# Patient Record
Sex: FEMALE | Race: WHITE | HISPANIC OR LATINO | Employment: FULL TIME | URBAN - METROPOLITAN AREA
[De-identification: names, ages, dates, MRNs, and addresses within clinical notes are randomized per-mention and may not be internally consistent; named-entity substitution may affect disease eponyms.]

---

## 2017-01-18 ENCOUNTER — HOSPITAL ENCOUNTER (EMERGENCY)
Facility: HOSPITAL | Age: 37
Discharge: HOME | End: 2017-01-18
Attending: EMERGENCY MEDICINE
Payer: MEDICAID

## 2017-01-18 VITALS
RESPIRATION RATE: 18 BRPM | HEIGHT: 67 IN | TEMPERATURE: 98 F | WEIGHT: 293 LBS | OXYGEN SATURATION: 98 % | DIASTOLIC BLOOD PRESSURE: 73 MMHG | BODY MASS INDEX: 45.99 KG/M2 | SYSTOLIC BLOOD PRESSURE: 134 MMHG | HEART RATE: 94 BPM

## 2017-01-18 DIAGNOSIS — M71.22 BAKER'S CYST OF KNEE, LEFT: Primary | ICD-10-CM

## 2017-01-18 DIAGNOSIS — R52 PAIN: ICD-10-CM

## 2017-01-18 LAB
AMORPH CRY URNS QL MICRO: ABNORMAL
B-HCG UR QL: NEGATIVE
BACTERIA #/AREA URNS HPF: ABNORMAL /HPF
BILIRUB UR QL STRIP: NEGATIVE
CLARITY UR: ABNORMAL
COLOR UR: ABNORMAL
CTP QC/QA: YES
GLUCOSE UR QL STRIP: NEGATIVE
HGB UR QL STRIP: ABNORMAL
HYALINE CASTS #/AREA URNS LPF: 0 /LPF
KETONES UR QL STRIP: NEGATIVE
LEUKOCYTE ESTERASE UR QL STRIP: NEGATIVE
MICROSCOPIC COMMENT: ABNORMAL
NITRITE UR QL STRIP: NEGATIVE
PH UR STRIP: 5 [PH] (ref 5–8)
PROT UR QL STRIP: ABNORMAL
RBC #/AREA URNS HPF: >100 /HPF (ref 0–4)
SP GR UR STRIP: 1.02 (ref 1–1.03)
SQUAMOUS #/AREA URNS HPF: 2 /HPF
URN SPEC COLLECT METH UR: ABNORMAL
UROBILINOGEN UR STRIP-ACNC: NEGATIVE EU/DL
WBC #/AREA URNS HPF: 2 /HPF (ref 0–5)

## 2017-01-18 PROCEDURE — 81000 URINALYSIS NONAUTO W/SCOPE: CPT

## 2017-01-18 PROCEDURE — 99284 EMERGENCY DEPT VISIT MOD MDM: CPT | Mod: 25

## 2017-01-18 PROCEDURE — 81025 URINE PREGNANCY TEST: CPT | Performed by: EMERGENCY MEDICINE

## 2017-01-18 RX ORDER — IBUPROFEN 800 MG/1
800 TABLET ORAL EVERY 8 HOURS PRN
Qty: 30 TABLET | Refills: 0 | Status: SHIPPED | OUTPATIENT
Start: 2017-01-18 | End: 2018-01-13

## 2017-01-18 NOTE — DISCHARGE INSTRUCTIONS
Bakers Cyst    You have a Bakers cyst. This is a lump or bulge in the back of your knee. It is caused when extra joint fluid flows into a small sac behind the knee. The extra fluid occurs because arthritis or a torn cartilage irritates the knee joint.  A small Bakers cyst often has no symptoms. A larger cyst can cause knee pain or a feeling of pressure behind your knee when you try to fully straighten or bend that joint. A Bakers cyst can leak, leading fluid to move down into your lower leg. This causes swelling, pain, and redness.  Treatment involves draining the extra fluid. Or medicine may be injected to reduce redness and swelling. If the extra fluid is caused by a torn cartilage, then surgery to repair the cartilage may be the best treatment option. If arthritis is the cause, and it does not get better with treatment, surgery can be done to remove the cyst.  Home care  · If you have knee pain, stay off the affected leg as much as possible until the pain eases.  · Apply an ice pack to the painful area for no more than 20 minutes. Do this every 3 to 6 hours for the first 24 to 48 hours. Keep using ice packs 3 to 4 times a day for the next few days, as needed for pain. To make an ice pack, put ice cubes in a sealed zip-lock plastic bag. Wrap the bag in a clean, thin towel or cloth. Never put ice or an ice pack directly on the skin.  · If you were given a hook-and-loop knee brace, you may open the brace to apply ice. Unless told otherwise, you may remove the brace to bathe and sleep.  · You may use over-the-counter pain medicine to control pain, unless another medicine was prescribed. Talk with your provider before using these medicines if you have chronic liver or kidney disease, or have ever had a stomach ulcer or GI (gastrointestinal) bleeding.     · If crutches or a walker have been recommended, dont bear full weight on your injured leg until you can do so without pain. Check with your provider before  returning to sports or full work duties.  Follow-up care  Follow up with your healthcare provider within 1 to 2 weeks, or as advised.  If X-rays were taken, you will be notified of any new findings that may affect your care.  When to seek medical advice  Call your healthcare provider right away if any of these occur:  · Toes or foot become swollen, cold, blue, numb or tingly  · Pain or swelling increases  · Warmth or redness appears over the knee  · Redness, swelling or pain occurs in the calf or lower leg  © 8901-2422 [employer]. [address] [place] [address]. All rights reserved. This information is not intended as a substitute for professional medical care. Always follow your healthcare professional's instructions.

## 2017-01-18 NOTE — ED AVS SNAPSHOT
[facility]  [address] [place] [address]               Mary Billings   2017  3:12 PM   ED    Description:  Female : 1980   Department:  [facility]           Your Care was Coordinated By:     Provider Role From To    Spencer Hector MD Attending Provider [phone] --    Lurdes Hale NP Nurse Practitioner [phone] --      Reason for Visit     Leg Swelling           Diagnoses this Visit        Comments    Baker's cyst of knee, left    -  Primary     Pain           ED Disposition     ED Disposition Condition Comment    Discharge             To Do List           Follow-up Information     Follow up with Casper Boyce MD.    Specialty:  Internal Medicine    Why:  As needed    Contact information:    [address]  [facility] [address]  [phone]          Follow up with [facility].    Specialty:  Emergency Medicine    Why:  If symptoms worsen or any other concerns    Contact information:    [address] [place] [address] [phone]        Follow up with Spencer Schulz MD.    Specialty:  Orthopedic Surgery    Why:  Call for appointment    Contact information:    [address] [place] [id number] [phone]         These Medications        Disp Refills Start End    ibuprofen (ADVIL,MOTRIN) 800 MG tablet 30 tablet 0 2017     Take 1 tablet (800 mg total) by mouth every 8 (eight) hours as needed for Pain. - Oral    Pharmacy: [facility] [address] [place] [address] [place] [address] AT [facility] Ph #: [phone]         [facility]     [facility] -  Assistance  Registered nurses in the [facility] provide clinical advisement, health education, appointment booking, and other advisory services.  Call for this free service at [phone].             Medications           Message regarding  "Medications     Verify the changes and/or additions to your medication regime listed below are the same as discussed with your clinician today.  If any of these changes or additions are incorrect, please notify your healthcare provider.        START taking these NEW medications        Refills    ibuprofen (ADVIL,MOTRIN) 800 MG tablet 0    Sig: Take 1 tablet (800 mg total) by mouth every 8 (eight) hours as needed for Pain.    Class: Print    Route: Oral      STOP taking these medications     duloxetine (CYMBALTA) 30 MG capsule Take 1 capsule (30 mg total) by mouth once daily.           Verify that the below list of medications is an accurate representation of the medications you are currently taking.  If none reported, the list may be blank. If incorrect, please contact your healthcare provider. Carry this list with you in case of emergency.           Current Medications     gabapentin (NEURONTIN) 300 MG capsule Take 1 capsule (300 mg total) by mouth 3 (three) times daily.    ibuprofen (ADVIL,MOTRIN) 800 MG tablet Take 1 tablet (800 mg total) by mouth every 8 (eight) hours as needed for Pain.           Clinical Reference Information           Your Vitals Were     BP Pulse Temp Resp Height Weight    134/73 (BP Location: Left arm, Patient Position: Sitting, BP Method: Automatic) 94 98.3 °F (36.8 °C) (Oral) 18 5' 7" (1.702 m) 192.8 kg (425 lb)    Last Period SpO2 BMI          01/14/2017 98% 66.56 kg/m2        Allergies as of 1/18/2017     No Known Allergies      Immunizations Administered on Date of Encounter - 1/18/2017     None      ED Micro, Lab, POCT     Start Ordered       Status Ordering Provider    01/18/17 1533 01/18/17 1532  Urinalysis  STAT      Final result     01/18/17 1532 01/18/17 1532  Urinalysis Microscopic  Once      Final result     01/18/17 1511 01/18/17 1510  POCT urine pregnancy  Once      Final result       ED Imaging Orders     Start Ordered       Status Ordering Provider    01/18/17 1532 01/18/17 " 1531  US Lower Extremity Veins Left  1 time imaging      Final result         Discharge Instructions         Bakers Cyst    You have a Bakers cyst. This is a lump or bulge in the back of your knee. It is caused when extra joint fluid flows into a small sac behind the knee. The extra fluid occurs because arthritis or a torn cartilage irritates the knee joint.  A small Bakers cyst often has no symptoms. A larger cyst can cause knee pain or a feeling of pressure behind your knee when you try to fully straighten or bend that joint. A Bakers cyst can leak, leading fluid to move down into your lower leg. This causes swelling, pain, and redness.  Treatment involves draining the extra fluid. Or medicine may be injected to reduce redness and swelling. If the extra fluid is caused by a torn cartilage, then surgery to repair the cartilage may be the best treatment option. If arthritis is the cause, and it does not get better with treatment, surgery can be done to remove the cyst.  Home care  · If you have knee pain, stay off the affected leg as much as possible until the pain eases.  · Apply an ice pack to the painful area for no more than 20 minutes. Do this every 3 to 6 hours for the first 24 to 48 hours. Keep using ice packs 3 to 4 times a day for the next few days, as needed for pain. To make an ice pack, put ice cubes in a sealed zip-lock plastic bag. Wrap the bag in a clean, thin towel or cloth. Never put ice or an ice pack directly on the skin.  · If you were given a hook-and-loop knee brace, you may open the brace to apply ice. Unless told otherwise, you may remove the brace to bathe and sleep.  · You may use over-the-counter pain medicine to control pain, unless another medicine was prescribed. Talk with your provider before using these medicines if you have chronic liver or kidney disease, or have ever had a stomach ulcer or GI (gastrointestinal) bleeding.     · If crutches or a walker have been recommended,  dont bear full weight on your injured leg until you can do so without pain. Check with your provider before returning to sports or full work duties.  Follow-up care  Follow up with your healthcare provider within 1 to 2 weeks, or as advised.  If X-rays were taken, you will be notified of any new findings that may affect your care.  When to seek medical advice  Call your healthcare provider right away if any of these occur:  · Toes or foot become swollen, cold, blue, numb or tingly  · Pain or swelling increases  · Warmth or redness appears over the knee  · Redness, swelling or pain occurs in the calf or lower leg  © 6678-4202 [employer]. [address] [place] [address], [place] [address]. All rights reserved. This information is not intended as a substitute for professional medical care. Always follow your healthcare professional's instructions.          Smoking Cessation     If you would like to quit smoking:   You may be eligible for free services if you are a [place] resident and started smoking cigarettes before September 1, 1988.  Call the Smoking Cessation Trust (SCT) toll free at (034) 375-6137 or (514) 030-6550.   Call 6-418-QUIT-NOW if you do not meet the above criteria.             [facility] complies with applicable Federal civil rights laws and does not discriminate on the basis of race, color, national origin, age, disability, or sex.        Language Assistance Services     ATTENTION: Language assistance services are available, free of charge. Please call [phone].      ATENCIÓN: Si habla español, tiene a bullard disposición servicios gratuitos de asistencia lingüística. Llame al [phone].     CHÚ Ý: N?u b?n nói Ti?ng Vi?t, có các d?ch v? h? tr? ngôn ng? mi?n phí dành cho b?n. G?i s? [phone].

## 2017-01-18 NOTE — ED PROVIDER NOTES
"Encounter Date: 2017    SCRIBE #1 NOTE: I, Cierra David , am scribing for, and in the presence of,  Lurdes Hale NP. I have scribed the following portions of the note - Other sections scribed: HPI and ROS .       History     Chief Complaint   Patient presents with    Leg Swelling     " For the past month my left knee has been hurting me, and for the past few days I have been having swelling in my left lower leg."      Review of patient's allergies indicates:  No Known Allergies  HPI Comments: CC: Leg swelling    HPI: This obese 37 y/o female with fibromyalgia and gall stones presents to the ED for evaluation of left knee pain that began 1 month ago and lower left leg pain, worse to her calf, with swelling that began 3 days ago. Pt has attempted treatment of the pain with ibuprofen, gabapentin and motrin, no relief. Pt also reports back pain that feels similar to back pain assocaited with 2 previous UTIs. Pt denies urinary symptoms. Pt's LMP was 17. No allergies.     The history is provided by the patient. No  was used.     Past Medical History   Diagnosis Date    Depression     Gall stones     Migraine headache     Obesity     SHEREE (obstructive sleep apnea)      No past medical history pertinent negatives.  Past Surgical History   Procedure Laterality Date    Russell tooth extraction      Tubal ligation       section       x 3 with BTL     Family History   Problem Relation Age of Onset    Diabetes Father     Hypertension Father     Breast cancer Mother     Depression Mother     Hypertension Mother     Cancer Brother      prostate    Cancer Maternal Grandmother     Hypertension Paternal Grandmother     Ovarian cancer Neg Hx     Colon cancer Neg Hx      Social History   Substance Use Topics    Smoking status: Former Smoker     Packs/day: 0.50     Years: 1.00     Quit date: 8/3/2016    Smokeless tobacco: Never Used    Alcohol use No     Review of Systems "   Constitutional: Negative for chills and fever.   HENT: Negative for ear pain and sore throat.    Respiratory: Negative for cough and shortness of breath.    Cardiovascular: Positive for leg swelling (lower left leg and left foot ).   Gastrointestinal: Negative for abdominal pain, nausea and vomiting.   Genitourinary: Negative for dysuria and flank pain.   Musculoskeletal: Positive for back pain and myalgias (lower left leg ).   Skin: Negative for rash.   Neurological: Negative for syncope and numbness.   Psychiatric/Behavioral: Negative for confusion.       Physical Exam   Initial Vitals   BP Pulse Resp Temp SpO2   01/18/17 1457 01/18/17 1457 01/18/17 1457 01/18/17 1457 01/18/17 1457   138/80 99 18 99.4 °F (37.4 °C) 97 %     Physical Exam    Nursing note and vitals reviewed.  Constitutional: She appears well-developed and well-nourished.   HENT:   Head: Normocephalic.   Eyes: Conjunctivae are normal.   Neck: Normal range of motion. Neck supple.   Cardiovascular: Normal rate, regular rhythm and normal heart sounds.   Pulmonary/Chest: Breath sounds normal. No respiratory distress. She has no wheezes.   Abdominal: Soft. There is no tenderness.   Mild CVAT L>R   Musculoskeletal: Normal range of motion. She exhibits edema and tenderness.   Mild edema to left foot.  No erythema, warmth, to left lower extremity.    (+) homins   (+) left calf tenderness   Neurological: She is alert and oriented to person, place, and time.   Skin: Skin is warm and dry.         ED Course   Procedures  Labs Reviewed   URINALYSIS - Abnormal; Notable for the following:        Result Value    Color, UA Red (*)     Appearance, UA Cloudy (*)     Protein, UA 2+ (*)     Occult Blood UA 3+ (*)     All other components within normal limits   URINALYSIS MICROSCOPIC - Abnormal; Notable for the following:     RBC, UA >100 (*)     All other components within normal limits   POCT URINE PREGNANCY             Medical Decision Making:   Initial Assessment:    36yr old female presents for swelling to left foot and left calf pain.  Differential Diagnosis:   DVT  Muscle strain  Dependant edema  ED Management:  Pts exam was positive for left calf pain and very mild edema to foot.  pt does not appear ill or toxic    Ultrasound results were reviewed and discussed with pt.     Based on exam today - I have low suspicion for medical, surgical or other life threatening illness and I believe pt is safe for discharge and outpatient f/u.    Pt verbalizes understanding of d/c instructions and will return for worsening condition.    Case discussed with attending who agrees with assessment and plan.               Scribe Attestation:   Scribe #1: I performed the above scribed service and the documentation accurately describes the services I performed. I attest to the accuracy of the note.    Attending Attestation:     Physician Attestation Statement for NP/PA:   I discussed this assessment and plan of this patient with the NP/PA, but I did not personally examine the patient. The face to face encounter was performed by the NP/PA.    Other NP/PA Attestation Additions:      Medical Decision Making: Agree with assessment and management.  No evidence of DVT.       Physician Attestation for Scribe:  Physician Attestation Statement for Scribe #1: I, Lurdes Hale NP, reviewed documentation, as scribed by Cierra Hernandez  in my presence, and it is both accurate and complete.                 ED Course   Value Comment By Time   US Lower Extremity Veins Left (Reviewed) Lurdes Hale NP 01/18 1630     Clinical Impression:   The primary encounter diagnosis was Baker's cyst of knee, left. A diagnosis of Pain was also pertinent to this visit.    Disposition:   Disposition: Discharged  Condition: Stable       Lurdes Hale NP  01/18/17 9053       Spencer Hector MD  01/19/17 0804

## 2018-01-13 ENCOUNTER — HOSPITAL ENCOUNTER (EMERGENCY)
Facility: OTHER | Age: 38
Discharge: HOME | End: 2018-01-13
Attending: EMERGENCY MEDICINE
Payer: MEDICAID

## 2018-01-13 VITALS
OXYGEN SATURATION: 99 % | HEIGHT: 67 IN | BODY MASS INDEX: 45.99 KG/M2 | WEIGHT: 293 LBS | DIASTOLIC BLOOD PRESSURE: 58 MMHG | TEMPERATURE: 100 F | HEART RATE: 81 BPM | RESPIRATION RATE: 16 BRPM | SYSTOLIC BLOOD PRESSURE: 125 MMHG

## 2018-01-13 DIAGNOSIS — J11.1 INFLUENZA-LIKE ILLNESS: Primary | ICD-10-CM

## 2018-01-13 LAB
B-HCG UR QL: NEGATIVE
CTP QC/QA: YES

## 2018-01-13 PROCEDURE — 99284 EMERGENCY DEPT VISIT MOD MDM: CPT | Mod: 25

## 2018-01-13 PROCEDURE — 25000003 PHARM REV CODE 250: Performed by: EMERGENCY MEDICINE

## 2018-01-13 PROCEDURE — 81025 URINE PREGNANCY TEST: CPT | Performed by: EMERGENCY MEDICINE

## 2018-01-13 RX ORDER — OSELTAMIVIR PHOSPHATE 75 MG/1
75 CAPSULE ORAL 2 TIMES DAILY
Qty: 10 CAPSULE | Refills: 0 | Status: SHIPPED | OUTPATIENT
Start: 2018-01-13 | End: 2018-01-18

## 2018-01-13 RX ORDER — ALBUTEROL SULFATE 90 UG/1
1-2 AEROSOL, METERED RESPIRATORY (INHALATION) EVERY 6 HOURS PRN
Qty: 1 INHALER | Refills: 0 | Status: SHIPPED | OUTPATIENT
Start: 2018-01-13 | End: 2019-01-13

## 2018-01-13 RX ORDER — ACETAMINOPHEN 500 MG
1000 TABLET ORAL
Status: COMPLETED | OUTPATIENT
Start: 2018-01-13 | End: 2018-01-13

## 2018-01-13 RX ORDER — IBUPROFEN 600 MG/1
600 TABLET ORAL EVERY 6 HOURS PRN
Qty: 30 TABLET | Refills: 0 | OUTPATIENT
Start: 2018-01-13 | End: 2020-01-09

## 2018-01-13 RX ORDER — BENZONATATE 100 MG/1
200 CAPSULE ORAL
Status: COMPLETED | OUTPATIENT
Start: 2018-01-13 | End: 2018-01-13

## 2018-01-13 RX ORDER — PROMETHAZINE HYDROCHLORIDE AND DEXTROMETHORPHAN HYDROBROMIDE 6.25; 15 MG/5ML; MG/5ML
5 SYRUP ORAL 3 TIMES DAILY PRN
Qty: 200 ML | Refills: 0 | Status: SHIPPED | OUTPATIENT
Start: 2018-01-13 | End: 2018-01-23

## 2018-01-13 RX ADMIN — ACETAMINOPHEN 1000 MG: 500 TABLET ORAL at 06:01

## 2018-01-13 RX ADMIN — BENZONATATE 200 MG: 100 CAPSULE ORAL at 06:01

## 2018-01-14 NOTE — ED PROVIDER NOTES
Encounter Date: 2018       History     Chief Complaint   Patient presents with    Cough     cough x 1 day and it hurts when I cough since I woke up from my nap     Mary Billings is a 37 y.o. female who presents to the Emergency Department with cough, congestion, body aches, fevers and chills.  Symptoms started today.  Last week patient son had influenza B.  Patient states it started with a cold today she feels like she got hit by a truck.  Symptoms worsen suddenly today      The history is provided by the patient.   URI   The primary symptoms include fever, cough and myalgias. Primary symptoms do not include sore throat, nausea or rash. The current episode started today. This is a new problem. The problem has been gradually worsening. The fever began today. The fever has been gradually worsening since its onset. The temperature was taken by no thermometer. The maximum temperature recorded prior to her arrival was unknown.   The cough began today. The cough is productive. There is nondescript sputum produced.   Myalgias began today. The myalgias have been gradually worsening since their onset. The myalgias are generalized. The myalgias are not associated with weakness, tenderness or swelling.   The onset of the illness is associated with exposure to sick contacts. Symptoms associated with the illness include congestion and rhinorrhea. The illness is not associated with chills, facial pain or sinus pressure.     Review of patient's allergies indicates:  No Known Allergies  Past Medical History:   Diagnosis Date    Depression     Fibromyalgia     Gall stones     Hypertension     Migraine headache     Obesity     SHEREE (obstructive sleep apnea)      Past Surgical History:   Procedure Laterality Date     SECTION      x 3 with BTL    TUBAL LIGATION      WISDOM TOOTH EXTRACTION       Family History   Problem Relation Age of Onset    Diabetes Father     Hypertension Father      Breast cancer Mother     Depression Mother     Hypertension Mother     Cancer Brother      prostate    Cancer Maternal Grandmother     Hypertension Paternal Grandmother     Ovarian cancer Neg Hx     Colon cancer Neg Hx      Social History   Substance Use Topics    Smoking status: Former Smoker     Packs/day: 0.50     Years: 1.00     Quit date: 8/3/2016    Smokeless tobacco: Never Used    Alcohol use No     Review of Systems   Constitutional: Positive for fever. Negative for chills.   HENT: Positive for congestion and rhinorrhea. Negative for sinus pressure and sore throat.    Respiratory: Positive for cough. Negative for shortness of breath.    Cardiovascular: Negative for chest pain.   Gastrointestinal: Negative for nausea.   Genitourinary: Negative for dysuria.   Musculoskeletal: Positive for myalgias. Negative for back pain.   Skin: Negative for rash.   Neurological: Negative for weakness.   Hematological: Does not bruise/bleed easily.   All other systems reviewed and are negative.      Physical Exam     Initial Vitals [01/13/18 1738]   BP Pulse Resp Temp SpO2   133/73 98 (!) 22 (!) 101.1 °F (38.4 °C) 97 %      MAP       93         Physical Exam    Nursing note and vitals reviewed.  Constitutional: She appears well-developed and well-nourished. She is Obese .   HENT:   Head: Normocephalic and atraumatic.   Right Ear: Tympanic membrane and external ear normal.   Left Ear: Tympanic membrane and external ear normal.   Nose: Mucosal edema and rhinorrhea present. Right sinus exhibits no maxillary sinus tenderness and no frontal sinus tenderness. Left sinus exhibits no maxillary sinus tenderness and no frontal sinus tenderness.   Mouth/Throat: Uvula is midline and mucous membranes are normal. Posterior oropharyngeal erythema present. No oropharyngeal exudate or posterior oropharyngeal edema.   Eyes: Conjunctivae and EOM are normal. Pupils are equal, round, and reactive to light. Right eye exhibits no  discharge. Left eye exhibits no discharge.   Neck: Normal range of motion. Neck supple. No JVD present.   Cardiovascular: Normal rate, regular rhythm, normal heart sounds and intact distal pulses. Exam reveals no gallop and no friction rub.    No murmur heard.  Pulmonary/Chest: Breath sounds normal. No respiratory distress. She has no wheezes. She has no rhonchi. She has no rales. She exhibits no tenderness.   Abdominal: Soft. Bowel sounds are normal. She exhibits no distension and no mass. There is no tenderness. There is no rebound and no guarding.   Musculoskeletal: Normal range of motion. She exhibits no edema or tenderness.   Lymphadenopathy:     She has no cervical adenopathy.   Neurological: She is alert and oriented to person, place, and time. She has normal strength and normal reflexes. She displays normal reflexes. No cranial nerve deficit or sensory deficit.   Skin: Skin is warm and dry. Capillary refill takes less than 2 seconds. No rash noted. No pallor.   Psychiatric: She has a normal mood and affect.         ED Course   Procedures  Labs Reviewed   POCT URINE PREGNANCY        Imaging Results          X-Ray Chest PA And Lateral (Final result)  Result time 01/13/18 18:18:02    Final result by Rizwana Matta MD (01/13/18 18:18:02)                 Impression:     No acute cardiopulmonary process.          Electronically signed by: RIZWANA MATTA MD  Date:     01/13/18  Time:    18:18              Narrative:    Chest PA and lateral    Indication:Cough    Comparison:7/31/2015    Findings:  The cardiomediastinal silhouette is not enlarged.  There is no pleural effusion.  The trachea is midline.  The lungs are symmetrically expanded bilaterally without evidence of acute parenchymal process. No large focal consolidation seen.  There is no pneumothorax.  The osseous structures are unremarkable.                                                   ED Course        Medical decision making   Chief complaint:  Cough, congestion, and body aches.  Feels like the flu.  Differential diagnosis: URI, viral illness, influenza, bronchitis, pneumonia  Treatment in the ED Physical Exam, Tylenol for fever and Tessalon Perles for cough  Patient reports feeling better after medication.  Temperature decreased after Tylenol.  Discussed labs, and imaging results.    Fill and take prescriptions Tamiflu, ibuprofen, & Promethazine DM as directed.  Return to the ED if symptoms worsen or do not resolve.   Answered questions and discussed discharge plan.    Patient feels much better and is ready for discharge.  Follow up with PCP in 1 days.    Clinical Impression:   The encounter diagnosis was Influenza-like illness.                           Catherine Altman DO  01/13/18 0195

## 2018-06-05 ENCOUNTER — HOSPITAL ENCOUNTER (EMERGENCY)
Facility: HOSPITAL | Age: 38
Discharge: HOME | End: 2018-06-05
Attending: EMERGENCY MEDICINE
Payer: MEDICAID

## 2018-06-05 VITALS
SYSTOLIC BLOOD PRESSURE: 142 MMHG | RESPIRATION RATE: 18 BRPM | HEIGHT: 67 IN | HEART RATE: 79 BPM | TEMPERATURE: 99 F | OXYGEN SATURATION: 99 % | BODY MASS INDEX: 45.99 KG/M2 | WEIGHT: 293 LBS | DIASTOLIC BLOOD PRESSURE: 57 MMHG

## 2018-06-05 DIAGNOSIS — R39.89 GENITAL SORE: Primary | ICD-10-CM

## 2018-06-05 LAB
B-HCG UR QL: NEGATIVE
BILIRUBIN, POC UA: NEGATIVE
BLOOD, POC UA: ABNORMAL
CLARITY, POC UA: CLEAR
COLOR, POC UA: YELLOW
CTP QC/QA: YES
GLUCOSE, POC UA: NEGATIVE
KETONES, POC UA: NEGATIVE
LEUKOCYTE EST, POC UA: ABNORMAL
NITRITE, POC UA: POSITIVE
PH UR STRIP: 6 [PH]
PROTEIN, POC UA: ABNORMAL
SPECIFIC GRAVITY, POC UA: >=1.03
UROBILINOGEN, POC UA: 0.2 E.U./DL

## 2018-06-05 PROCEDURE — 87529 HSV DNA AMP PROBE: CPT

## 2018-06-05 PROCEDURE — 87510 GARDNER VAG DNA DIR PROBE: CPT

## 2018-06-05 PROCEDURE — 87088 URINE BACTERIA CULTURE: CPT

## 2018-06-05 PROCEDURE — 99284 EMERGENCY DEPT VISIT MOD MDM: CPT | Mod: 25

## 2018-06-05 PROCEDURE — 25000003 PHARM REV CODE 250: Performed by: EMERGENCY MEDICINE

## 2018-06-05 PROCEDURE — 87147 CULTURE TYPE IMMUNOLOGIC: CPT

## 2018-06-05 PROCEDURE — 63600175 PHARM REV CODE 636 W HCPCS: Performed by: EMERGENCY MEDICINE

## 2018-06-05 PROCEDURE — 87480 CANDIDA DNA DIR PROBE: CPT

## 2018-06-05 PROCEDURE — 87086 URINE CULTURE/COLONY COUNT: CPT

## 2018-06-05 PROCEDURE — 81003 URINALYSIS AUTO W/O SCOPE: CPT

## 2018-06-05 PROCEDURE — 96372 THER/PROPH/DIAG INJ SC/IM: CPT

## 2018-06-05 PROCEDURE — 87186 SC STD MICRODIL/AGAR DIL: CPT

## 2018-06-05 PROCEDURE — 87077 CULTURE AEROBIC IDENTIFY: CPT

## 2018-06-05 PROCEDURE — 87491 CHLMYD TRACH DNA AMP PROBE: CPT

## 2018-06-05 PROCEDURE — 81025 URINE PREGNANCY TEST: CPT | Performed by: EMERGENCY MEDICINE

## 2018-06-05 RX ORDER — CEFTRIAXONE 250 MG/1
250 INJECTION, POWDER, FOR SOLUTION INTRAMUSCULAR; INTRAVENOUS
Status: COMPLETED | OUTPATIENT
Start: 2018-06-05 | End: 2018-06-05

## 2018-06-05 RX ORDER — LIDOCAINE HYDROCHLORIDE 20 MG/ML
JELLY TOPICAL
Status: COMPLETED | OUTPATIENT
Start: 2018-06-05 | End: 2018-06-05

## 2018-06-05 RX ORDER — LIDOCAINE HYDROCHLORIDE 20 MG/ML
JELLY TOPICAL 2 TIMES DAILY
Qty: 1 TUBE | Refills: 0 | Status: SHIPPED | OUTPATIENT
Start: 2018-06-05 | End: 2021-01-14

## 2018-06-05 RX ORDER — METRONIDAZOLE 500 MG/1
500 TABLET ORAL 2 TIMES DAILY
Qty: 20 TABLET | Refills: 0 | Status: SHIPPED | OUTPATIENT
Start: 2018-06-05 | End: 2018-06-15

## 2018-06-05 RX ORDER — DOXYCYCLINE 100 MG/1
100 CAPSULE ORAL 2 TIMES DAILY
Qty: 20 CAPSULE | Refills: 0 | Status: SHIPPED | OUTPATIENT
Start: 2018-06-05 | End: 2018-06-15

## 2018-06-05 RX ORDER — VALACYCLOVIR HYDROCHLORIDE 1 G/1
1000 TABLET, FILM COATED ORAL 3 TIMES DAILY
Qty: 30 TABLET | Refills: 0 | Status: SHIPPED | OUTPATIENT
Start: 2018-06-05 | End: 2018-06-15

## 2018-06-05 RX ORDER — AZITHROMYCIN 250 MG/1
1000 TABLET, FILM COATED ORAL
Status: COMPLETED | OUTPATIENT
Start: 2018-06-05 | End: 2018-06-05

## 2018-06-05 RX ORDER — ONDANSETRON 4 MG/1
4 TABLET, FILM COATED ORAL EVERY 8 HOURS PRN
Qty: 20 TABLET | Refills: 0 | Status: SHIPPED | OUTPATIENT
Start: 2018-06-05 | End: 2021-01-14

## 2018-06-05 RX ADMIN — AZITHROMYCIN MONOHYDRATE 1000 MG: 250 TABLET ORAL at 03:06

## 2018-06-05 RX ADMIN — LIDOCAINE HYDROCHLORIDE 10 ML: 20 JELLY TOPICAL at 03:06

## 2018-06-05 RX ADMIN — CEFTRIAXONE SODIUM 250 MG: 250 INJECTION, POWDER, FOR SOLUTION INTRAMUSCULAR; INTRAVENOUS at 03:06

## 2018-06-05 NOTE — ED PROVIDER NOTES
"Encounter Date: 2018    SCRIBE #1 NOTE: I, Guillermo Montanez, am scribing for, and in the presence of,  Evelyn Farfan NP. I have scribed the entire note.       History     Chief Complaint   Patient presents with    Female  Problem     pt reports vaginal itching, burning and burning with urination x's 6 days . "I've had a UTI before but this feels different."     This is a 37 y.o. Female with a past medical history significant for anxiety and obesity who presents with 1 week of vaginal discomfort and itching. She notes her symptoms have been progressing. She has tried OTC creams for her symptoms without relief. She describes her pain as burning in quality. Her pian is exacerbated by washing the area and when she urinates. She notes " I fell little bumps where I hurt."  She notes associated nausea. She denies any fever, vaginal discharge, abdominal pain, or vomiting. Her LMP was 1 week ago. She denies any concern for STIs.       The history is provided by the patient.     Review of patient's allergies indicates:  No Known Allergies  Past Medical History:   Diagnosis Date    Depression     Fibromyalgia     Gall stones     Hypertension     Migraine headache     Obesity     SHEREE (obstructive sleep apnea)      Past Surgical History:   Procedure Laterality Date     SECTION      x 3 with BTL    TUBAL LIGATION      WISDOM TOOTH EXTRACTION       Family History   Problem Relation Age of Onset    Diabetes Father     Hypertension Father     Breast cancer Mother     Depression Mother     Hypertension Mother     Cancer Brother         prostate    Cancer Maternal Grandmother     Hypertension Paternal Grandmother     Ovarian cancer Neg Hx     Colon cancer Neg Hx      Social History   Substance Use Topics    Smoking status: Former Smoker     Packs/day: 0.50     Years: 1.00     Quit date: 8/3/2016    Smokeless tobacco: Never Used    Alcohol use No     Review of Systems   Constitutional: Negative for " chills and fever.   HENT: Negative for sore throat.    Eyes: Negative for visual disturbance.   Respiratory: Negative for shortness of breath.    Cardiovascular: Negative for chest pain.   Gastrointestinal: Positive for nausea. Negative for abdominal pain and vomiting.   Genitourinary: Positive for vaginal discharge and vaginal pain. Negative for dysuria, flank pain, frequency and vaginal bleeding.   Musculoskeletal: Negative for back pain, gait problem and myalgias.   Skin: Negative for rash.   Neurological: Negative for dizziness and weakness.   Hematological: Does not bruise/bleed easily.       Physical Exam     Initial Vitals [06/05/18 1342]   BP Pulse Resp Temp SpO2   (!) 142/57 79 18 98.5 °F (36.9 °C) 99 %      MAP       85.33         Physical Exam    Nursing note and vitals reviewed.  Constitutional: She appears well-developed and well-nourished. She is not diaphoretic.  Non-toxic appearance. She appears distressed.   HENT:   Head: Normocephalic and atraumatic.   Right Ear: Hearing and external ear normal.   Left Ear: Hearing and external ear normal.   Mouth/Throat: Mucous membranes are normal.   Eyes: EOM are normal. Pupils are equal, round, and reactive to light.   Neck: Trachea normal and full passive range of motion without pain. Neck supple.   Cardiovascular: Normal rate, regular rhythm and normal heart sounds.   No murmur heard.  Pulses:       Radial pulses are 2+ on the right side, and 2+ on the left side.        Dorsalis pedis pulses are 2+ on the right side, and 2+ on the left side.   Pulmonary/Chest: Effort normal and breath sounds normal. No respiratory distress. She has no wheezes. She has no rhonchi.   Abdominal: Soft. There is no tenderness. There is no rigidity, no rebound and no guarding.   Genitourinary: There is tenderness and lesion on the right labia. There is tenderness and lesion on the left labia. Cervix exhibits discharge. There is tenderness in the vagina.   Genitourinary Comments:  Lesions noted to the vaginal introitus that have the appearance of ulcers.  Extreme tenderness noted during pelvic exam.  Patient is distressed and crying throughout exam due to pain.  No bleeding.  Some brown-colored discharge noted.    Lymphadenopathy:        Right: No inguinal adenopathy present.        Left: No inguinal adenopathy present.   Neurological: She is alert and oriented to person, place, and time.   Skin: Skin is warm and dry.   Psychiatric: She has a normal mood and affect. Her behavior is normal.         ED Course   Procedures  Labs Reviewed   POCT URINE PREGNANCY   POCT URINALYSIS W/O SCOPE             Medical Decision Making:   History:   Old Medical Records: I decided to obtain old medical records.  Differential Diagnosis:   Herpes Simplex Virus  Bacterial Vaginosis  Candida  Chlamydia  Gonorrhea  Pelvic Inflammatory Disease  Clinical Tests:   Lab Tests: Ordered and Reviewed  ED Management:  UPT negative.  UA is positive for nitrites, however otherwise unremarkable.  Culture pending.  GC cultures and wet prep were also pending.    Based on history and physical exam, I have some concerns for pelvic inflammatory disease due to significant tenderness noted during pelvic exam.  Clinical presentation suggests HSV infection which was also sent for PCR.  The patient will be treated empirically for gonorrhea and chlamydia due to suspected exposure to sexually transmitted disease.  Extensive teaching conducted concerning HSV infection.    Patient discharged with prescriptions for Valtrex, doxycycline, and Flagyl, as well as Zofran.  Instructed to follow up with her OBGYN provider for more comprehensive STD testing.  Safe sex practices encouraged.  Return precautions given.  Case discussed with supervising physician who agrees with plan.            Scribe Attestation:   Scribe #1: I performed the above scribed service and the documentation accurately describes the services I performed. I attest to the  accuracy of the note.    I, ELIF Duffy, POONAM, LINDA, personally performed the services described in this documentation. All medical record entries made by the scribe were at my direction and in my presence. I have reviewed the chart and agree that the record reflects my personal performance and is accurate and complete. ELIF Duffy, POONAM, LINDA 9:16 PM 06/06/2018           Clinical Impression:     1. Genital sore         No orders to display                              Evelyn Farfan NP  06/06/18 2047       Evelyn Farfan NP  06/06/18 211

## 2018-06-06 LAB
C TRACH DNA SPEC QL NAA+PROBE: NOT DETECTED
CANDIDA RRNA VAG QL PROBE: NEGATIVE
G VAGINALIS RRNA GENITAL QL PROBE: POSITIVE
N GONORRHOEA DNA SPEC QL NAA+PROBE: NOT DETECTED
T VAGINALIS RRNA GENITAL QL PROBE: NEGATIVE

## 2018-06-07 LAB
BACTERIA UR CULT: NORMAL
BACTERIA UR CULT: NORMAL
HSV1 DNA SPEC QL NAA+PROBE: NEGATIVE
HSV2 DNA SPEC QL NAA+PROBE: POSITIVE
SPECIMEN SOURCE: ABNORMAL

## 2018-06-09 ENCOUNTER — TELEPHONE (OUTPATIENT)
Dept: EMERGENCY MEDICINE | Facility: HOSPITAL | Age: 38
End: 2018-06-09

## 2018-08-19 ENCOUNTER — HOSPITAL ENCOUNTER (EMERGENCY)
Facility: HOSPITAL | Age: 38
Discharge: HOME | End: 2018-08-19
Attending: INTERNAL MEDICINE
Payer: MEDICAID

## 2018-08-19 VITALS
HEART RATE: 98 BPM | TEMPERATURE: 98 F | HEIGHT: 67 IN | BODY MASS INDEX: 45.99 KG/M2 | SYSTOLIC BLOOD PRESSURE: 111 MMHG | DIASTOLIC BLOOD PRESSURE: 66 MMHG | RESPIRATION RATE: 17 BRPM | WEIGHT: 293 LBS | OXYGEN SATURATION: 100 %

## 2018-08-19 DIAGNOSIS — R06.02 SOB (SHORTNESS OF BREATH): ICD-10-CM

## 2018-08-19 DIAGNOSIS — K21.9 GASTROESOPHAGEAL REFLUX DISEASE, ESOPHAGITIS PRESENCE NOT SPECIFIED: Primary | ICD-10-CM

## 2018-08-19 DIAGNOSIS — R06.00 DYSPNEA: ICD-10-CM

## 2018-08-19 LAB
ALBUMIN SERPL-MCNC: 3.5 G/DL (ref 3.3–5.5)
ALP SERPL-CCNC: 74 U/L (ref 42–141)
B-HCG UR QL: NEGATIVE
BILIRUB SERPL-MCNC: 0.5 MG/DL (ref 0.2–1.6)
BUN SERPL-MCNC: 15 MG/DL (ref 7–22)
CALCIUM SERPL-MCNC: 8.7 MG/DL (ref 8–10.3)
CHLORIDE SERPL-SCNC: 101 MMOL/L (ref 98–108)
CREAT SERPL-MCNC: 1.1 MG/DL (ref 0.6–1.2)
CTP QC/QA: YES
GLUCOSE SERPL-MCNC: 115 MG/DL (ref 73–118)
POC ALT (SGPT): 23 U/L (ref 10–47)
POC AST (SGOT): 31 U/L (ref 11–38)
POC CARDIAC TROPONIN I: 0 NG/ML
POC TCO2: 28 MMOL/L (ref 18–33)
POTASSIUM BLD-SCNC: 3.7 MMOL/L (ref 3.6–5.1)
PROTEIN, POC: 6.9 G/DL (ref 6.4–8.1)
SAMPLE: NORMAL
SODIUM BLD-SCNC: 139 MMOL/L (ref 128–145)

## 2018-08-19 PROCEDURE — 81025 URINE PREGNANCY TEST: CPT | Performed by: EMERGENCY MEDICINE

## 2018-08-19 PROCEDURE — 25000003 PHARM REV CODE 250: Performed by: INTERNAL MEDICINE

## 2018-08-19 PROCEDURE — 93005 ELECTROCARDIOGRAM TRACING: CPT

## 2018-08-19 PROCEDURE — 93010 ELECTROCARDIOGRAM REPORT: CPT | Mod: ,,, | Performed by: INTERNAL MEDICINE

## 2018-08-19 PROCEDURE — 80053 COMPREHEN METABOLIC PANEL: CPT

## 2018-08-19 PROCEDURE — 99284 EMERGENCY DEPT VISIT MOD MDM: CPT | Mod: 25

## 2018-08-19 PROCEDURE — 84484 ASSAY OF TROPONIN QUANT: CPT

## 2018-08-19 PROCEDURE — 85025 COMPLETE CBC W/AUTO DIFF WBC: CPT

## 2018-08-19 RX ORDER — HYDROCHLOROTHIAZIDE 25 MG/1
25 TABLET ORAL DAILY
COMMUNITY

## 2018-08-19 RX ORDER — OMEPRAZOLE 40 MG/1
40 CAPSULE, DELAYED RELEASE ORAL DAILY
COMMUNITY

## 2018-08-19 RX ORDER — LISINOPRIL 10 MG/1
10 TABLET ORAL DAILY
COMMUNITY

## 2018-08-19 RX ORDER — METOPROLOL TARTRATE 25 MG/1
25 TABLET, FILM COATED ORAL 2 TIMES DAILY
COMMUNITY

## 2018-08-19 RX ORDER — BUPROPION HYDROCHLORIDE 150 MG/1
150 TABLET ORAL DAILY
COMMUNITY

## 2018-08-19 RX ORDER — BUPROPION HYDROCHLORIDE 300 MG/1
300 TABLET ORAL DAILY
COMMUNITY

## 2018-08-19 RX ORDER — ASPIRIN 325 MG
325 TABLET ORAL
Status: COMPLETED | OUTPATIENT
Start: 2018-08-19 | End: 2018-08-19

## 2018-08-19 RX ADMIN — LIDOCAINE HYDROCHLORIDE: 20 SOLUTION ORAL; TOPICAL at 09:08

## 2018-08-19 RX ADMIN — ASPIRIN 325 MG ORAL TABLET 325 MG: 325 PILL ORAL at 08:08

## 2018-08-20 NOTE — ED NOTES
Neuro: AAOx3  HEENT: WDL  Resp: Airway patent, respirations even/unlabored. Pt reports SOB while at work. 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 Pt denies SOB at this time.  Cardiac: Skin pink/warm/dry, pulses intact. Pt denies any chest pain  Abdomen: Soft, non-tender to palpation, denies N/V/D  : No signs or symptoms of urinary disturbances  Musculoskeletal: Moves all extremities equally, ROM intact  Skin: Skin is dry and intact.

## 2018-08-20 NOTE — ED PROVIDER NOTES
"Encounter Date: 2018    SCRIBE #1 NOTE: I, Ayesha Mendenhall, am scribing for, and in the presence of,  Dr. Dorado. I have scribed the following portions of the note - Other sections scribed: HPI, ROS, PE.       History     Chief Complaint   Patient presents with    Shortness of Breath     pt states "All day today I've been having a hard time catching my breath. 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 pt also c/o epigastric discomfort earlier while at work, but discomfort has subsided     This is a 37 year old female who presents to the ED complaining of an episode of sharp epigastric abdominal pain with associated shortness of breath at 9:30AM this morning. She reports she broke out in a sweat. She reports the Sx lasted for 30 minutes and resolved on their own. She reports just prior to the episode of symptoms she ate a sausage biscuit and coffee from [employer].  She reports a Hx of GERD and takes GERD medication every morning.        The history is provided by the patient.   Shortness of Breath   This is a new problem. The average episode lasts 30 minutes. The problem occurs rarely.The current episode started 6 to 12 hours ago. The problem has been resolved. Associated symptoms include abdominal pain (epigastric). Pertinent negatives include no fever. Precipitated by: Food. Risk factors: Hx of GERD. Associated medical issues comments: GERD.     Review of patient's allergies indicates:  No Known Allergies  Past Medical History:   Diagnosis Date    Depression     Fibromyalgia     Gall stones     Hypertension     Migraine headache     Obesity     SHEREE (obstructive sleep apnea)      Past Surgical History:   Procedure Laterality Date     SECTION      x 3 with BTL    TUBAL LIGATION      WISDOM TOOTH EXTRACTION       Family History   Problem Relation Age of Onset    Diabetes Father     Hypertension Father     Breast cancer Mother     Depression " Mother     Hypertension Mother     Cancer Brother         prostate    Cancer Maternal Grandmother     Hypertension Paternal Grandmother     Ovarian cancer Neg Hx     Colon cancer Neg Hx      Social History     Tobacco Use    Smoking status: Former Smoker     Packs/day: 0.50     Years: 1.00     Pack years: 0.50     Last attempt to quit: 8/3/2016     Years since quittin.0    Smokeless tobacco: Never Used   Substance Use Topics    Alcohol use: No    Drug use: No     Review of Systems   Constitutional: Negative for fever.   HENT: Negative for trouble swallowing.    Respiratory: Positive for shortness of breath.    Gastrointestinal: Positive for abdominal pain (epigastric).   All other systems reviewed and are negative.      Physical Exam     Initial Vitals [18 1810]   BP Pulse Resp Temp SpO2   123/78 (!) 118 18 97.8 °F (36.6 °C) 99 %      MAP       --         Physical Exam    Nursing note and vitals reviewed.  Constitutional: She appears well-developed and well-nourished. She is Obese .   HENT:   Head: Normocephalic and atraumatic.   Nose: Nose normal. No mucosal edema or rhinorrhea.   Mouth/Throat: Oropharynx is clear and moist.   Eyes: Conjunctivae and EOM are normal. Pupils are equal, round, and reactive to light.   Neck: Normal range of motion. Neck supple.   Cardiovascular: Regular rhythm and intact distal pulses. Tachycardia present.    Pulses:       Radial pulses are 2+ on the right side, and 2+ on the left side.        Dorsalis pedis pulses are 2+ on the right side, and 2+ on the left side.   118 bpm upon initial exam   Pulmonary/Chest: Breath sounds normal. No respiratory distress. She has no wheezes. She has no rhonchi. She has no rales.   CTAB     Abdominal: Soft. She exhibits no distension. There is no tenderness. There is no rebound and no guarding.   Musculoskeletal: Normal range of motion. She exhibits no tenderness.   Neurological: She is alert and oriented to person, place, and time.    Skin: Skin is warm and dry.   Psychiatric: She has a normal mood and affect. Her behavior is normal.         ED Course   Procedures  Labs Reviewed   POCT URINE PREGNANCY          Imaging Results    None          Medical Decision Making:   Initial Assessment:   This is a 37 year old female who presents to the ED complaining of an episode of sharp epigastric abdominal pain with associated shortness of breath at 9:30AM this morning. She reports she broke out in a sweat. She reports the Sx lasted for 30 minutes and resolved on their own. She reports just prior to the episode of symptoms she ate a sausage biscuit and coffee from [employer].  She reports a Hx of GERD and takes GERD medication every morning.      Clinical Tests:   Lab Tests: Ordered and Reviewed  Radiological Study: Ordered and Reviewed  Medical Tests: Ordered and Reviewed            Scribe Attestation:   Scribe #1: I performed the above scribed service and the documentation accurately describes the services I performed. I attest to the accuracy of the note.    This document was produced by a scribe under my direction and in my presence. I agree with the content of the note and have made any necessary edits.     Dr. Dorado    08/30/2018 10:42 PM             Clinical Impression:     1. SOB (shortness of breath)    2. Dyspnea            Disposition:   Disposition: Discharged  Condition: Stable                        Gian Dorado MD  08/30/18 [id number]

## 2019-02-05 ENCOUNTER — HOSPITAL ENCOUNTER (EMERGENCY)
Facility: HOSPITAL | Age: 39
Discharge: HOME | End: 2019-02-05
Attending: EMERGENCY MEDICINE
Payer: MEDICAID

## 2019-02-05 VITALS
HEART RATE: 99 BPM | OXYGEN SATURATION: 98 % | TEMPERATURE: 98 F | WEIGHT: 293 LBS | RESPIRATION RATE: 18 BRPM | HEIGHT: 67 IN | DIASTOLIC BLOOD PRESSURE: 76 MMHG | SYSTOLIC BLOOD PRESSURE: 128 MMHG | BODY MASS INDEX: 45.99 KG/M2

## 2019-02-05 DIAGNOSIS — R52 PAIN: ICD-10-CM

## 2019-02-05 DIAGNOSIS — S92.902A CLOSED FRACTURE OF LEFT FOOT, INITIAL ENCOUNTER: Primary | ICD-10-CM

## 2019-02-05 LAB
B-HCG UR QL: NEGATIVE
CTP QC/QA: YES

## 2019-02-05 PROCEDURE — 29515 APPLICATION SHORT LEG SPLINT: CPT | Mod: LT,ER

## 2019-02-05 PROCEDURE — 25000003 PHARM REV CODE 250: Mod: ER | Performed by: NURSE PRACTITIONER

## 2019-02-05 PROCEDURE — 99284 EMERGENCY DEPT VISIT MOD MDM: CPT | Mod: 25,ER

## 2019-02-05 PROCEDURE — 81025 URINE PREGNANCY TEST: CPT | Mod: ER | Performed by: NURSE PRACTITIONER

## 2019-02-05 RX ORDER — TRAMADOL HYDROCHLORIDE 50 MG/1
50 TABLET ORAL EVERY 6 HOURS PRN
Qty: 12 TABLET | Refills: 0 | Status: SHIPPED | OUTPATIENT
Start: 2019-02-05 | End: 2021-09-16

## 2019-02-05 RX ORDER — DICLOFENAC SODIUM 50 MG/1
50 TABLET, DELAYED RELEASE ORAL 3 TIMES DAILY PRN
Qty: 30 TABLET | Refills: 0 | OUTPATIENT
Start: 2019-02-05 | End: 2020-01-09

## 2019-02-05 RX ORDER — IBUPROFEN 600 MG/1
600 TABLET ORAL ONCE
Status: COMPLETED | OUTPATIENT
Start: 2019-02-05 | End: 2019-02-05

## 2019-02-05 RX ADMIN — IBUPROFEN 600 MG: 600 TABLET ORAL at 06:02

## 2019-02-05 NOTE — ED PROVIDER NOTES
"Encounter Date: 2019       History     Chief Complaint   Patient presents with    Foot Pain     pt reports left foot pain for 1 month, denies injury "im on my feet alot, i suffer from fibromyalgia so It might be a flare up"     A 38 years old female with left foot for 1 month. Pt denies injury. Pt states she had to leave work today. Pt has been taking Tylenol and Motrin prn. Pt has a hx of Depression, Fibromyalgia, Obesity, and HTN.       The history is provided by the patient.   Foot Pain   This is a new problem. The current episode started more than 1 week ago. The problem has been gradually worsening. The symptoms are aggravated by bending and twisting. Nothing relieves the symptoms.     Review of patient's allergies indicates:  No Known Allergies  Past Medical History:   Diagnosis Date    Depression     Fibromyalgia     Gall stones     Hypertension     Migraine headache     Obesity     SHEREE (obstructive sleep apnea)      Past Surgical History:   Procedure Laterality Date     SECTION      x 3 with BTL    TUBAL LIGATION      WISDOM TOOTH EXTRACTION       Family History   Problem Relation Age of Onset    Diabetes Father     Hypertension Father     Breast cancer Mother     Depression Mother     Hypertension Mother     Cancer Brother         prostate    Cancer Maternal Grandmother     Hypertension Paternal Grandmother     Ovarian cancer Neg Hx     Colon cancer Neg Hx      Social History     Tobacco Use    Smoking status: Former Smoker     Packs/day: 0.50     Years: 1.00     Pack years: 0.50     Last attempt to quit: 8/3/2016     Years since quittin.5    Smokeless tobacco: Never Used   Substance Use Topics    Alcohol use: No    Drug use: No     Review of Systems   Constitutional: Negative.    HENT: Negative.    Eyes: Negative.    Respiratory: Negative.    Cardiovascular: Negative.    Gastrointestinal: Negative.    Endocrine: Negative.    Genitourinary: Negative.  "   Musculoskeletal:        Left foot pain    Allergic/Immunologic: Negative.    Neurological: Negative.    Hematological: Negative.    Psychiatric/Behavioral: Negative.    All other systems reviewed and are negative.      Physical Exam     Initial Vitals [02/05/19 1645]   BP Pulse Resp Temp SpO2   122/72 98 18 98.1 °F (36.7 °C) 99 %      MAP       --         Physical Exam    Nursing note and vitals reviewed.  Constitutional: Vital signs are normal. She appears well-developed. She is cooperative. She does not appear ill.   Patient is morbidly obese.   HENT:   Right Ear: External ear normal.   Left Ear: External ear normal.   Nose: Nose normal.   Mouth/Throat: Oropharynx is clear and moist.   Eyes: Conjunctivae and lids are normal.   Neck: Normal range of motion. Neck supple.   Cardiovascular: Normal rate, regular rhythm, S1 normal, S2 normal and normal heart sounds.   Pulses:       Dorsalis pedis pulses are 2+ on the right side, and 2+ on the left side.   Pulmonary/Chest: Effort normal and breath sounds normal.   Abdominal: Soft. Normal appearance. There is no tenderness.   Musculoskeletal: Normal range of motion.        Left foot: There is tenderness and swelling. There is no deformity.        Feet:    Neurological: She is alert and oriented to person, place, and time.   Skin: Skin is warm, dry and intact.   Psychiatric: She has a normal mood and affect. Her speech is normal. Cognition and memory are normal.         ED Course   Splint Application  Date/Time: 2/5/2019 6:40 PM  Performed by: CHACHO Mahan  Authorized by: Tono Bertrand MD   Location details: left leg  Splint type: short leg  Post-procedure: The splinted body part was neurovascularly unchanged following the procedure.  Patient tolerance: Patient tolerated the procedure well with no immediate complications        Labs Reviewed   POCT URINE PREGNANCY          Imaging Results          X-Ray Foot Complete Left (Final result)  Result time 02/05/19  17:07:53    Final result by Yamil Matta MD (02/05/19 17:07:53)                 Impression:      1. Fracture of the 5th digit as above.      Electronically signed by: Yamil Matta MD  Date:    02/05/2019  Time:    17:07             Narrative:    EXAMINATION:  XR FOOT COMPLETE 3 VIEW LEFT    CLINICAL HISTORY:  .  Pain, unspecified    TECHNIQUE:  AP, lateral and oblique views of the left foot were performed.    COMPARISON:  None    FINDINGS:  Three views.    There is an acute impacted mildly displaced fracture of the proximal phalange of the 5th toe, fracture plane involves the metatarsophalangeal joint.  There is edema about the site.  No dislocation.  Edema overlies the dorsal aspect of the foot.  Degenerative changes are noted of the dorsal aspect of the foot proximally.                                 Medical Decision Making:   Initial Assessment:   A 38 years old female with left foot for 1 month. Pt denies injury. Pt states she had to leave work today. Pt has been taking Tylenol and Motrin prn. Pt has a hx of Depression, Fibromyalgia, Obesity, and HTN.     Differential Diagnosis:   Foot fracture, Foot sprain   Clinical Tests:   Radiological Study: Ordered and Reviewed  ED Management:  Left foot x-ray with mildly displaced fractures of the left 5th proximal phalanx.   Short legs splint applied.  Offered crutches.  Patient states she is unable to walk with crutches.    Discharge home with Voltaren and Tramadol.  Follow-up with [facility] for ortho referral at [facility].   Return ED for worsening of symptoms.                          Clinical Impression:   The primary encounter diagnosis was Closed fracture of left foot, initial encounter. A diagnosis of Pain was also pertinent to this visit.                             CHACHO Mahan  02/06/19 [id number]

## 2019-02-06 NOTE — ED NOTES
Splint applied to the lt. Ankle and lower leg,  Walking boot did not fit correctly,    Pt. Then given her d/c paper work and assisted to the wheel chair then to her private veh.

## 2019-02-06 NOTE — ED NOTES
Pt left lower leg placed in short splint. PMS intact before and after splint application. Pt given education on splint care and voiced understanding.

## 2019-07-22 ENCOUNTER — HOSPITAL ENCOUNTER (EMERGENCY)
Facility: HOSPITAL | Age: 39
Discharge: HOME | End: 2019-07-22
Attending: INTERNAL MEDICINE
Payer: MEDICAID

## 2019-07-22 VITALS
HEART RATE: 88 BPM | RESPIRATION RATE: 20 BRPM | OXYGEN SATURATION: 100 % | TEMPERATURE: 98 F | DIASTOLIC BLOOD PRESSURE: 69 MMHG | HEIGHT: 67 IN | SYSTOLIC BLOOD PRESSURE: 119 MMHG | BODY MASS INDEX: 45.99 KG/M2 | WEIGHT: 293 LBS

## 2019-07-22 DIAGNOSIS — R00.0 TACHYCARDIA: ICD-10-CM

## 2019-07-22 DIAGNOSIS — Z87.898 HISTORY OF SHORTNESS OF BREATH: ICD-10-CM

## 2019-07-22 DIAGNOSIS — E86.0 DEHYDRATION: ICD-10-CM

## 2019-07-22 DIAGNOSIS — R42 DIZZINESS: Primary | ICD-10-CM

## 2019-07-22 LAB
ALBUMIN SERPL-MCNC: 3.2 G/DL (ref 3.3–5.5)
ALP SERPL-CCNC: 75 U/L (ref 42–141)
B-HCG UR QL: NEGATIVE
BILIRUB SERPL-MCNC: 0.5 MG/DL (ref 0.2–1.6)
BUN SERPL-MCNC: 12 MG/DL (ref 7–22)
CALCIUM SERPL-MCNC: 8.8 MG/DL (ref 8–10.3)
CHLORIDE SERPL-SCNC: 103 MMOL/L (ref 98–108)
CREAT SERPL-MCNC: 0.9 MG/DL (ref 0.6–1.2)
CTP QC/QA: YES
GLUCOSE SERPL-MCNC: 90 MG/DL (ref 73–118)
POC ALT (SGPT): 20 U/L (ref 10–47)
POC AST (SGOT): 26 U/L (ref 11–38)
POC B-TYPE NATRIURETIC PEPTIDE: 15.3 PG/ML (ref 0–100)
POC CARDIAC TROPONIN I: 0 NG/ML
POC D-DI: 249 NG/ML (ref 0–450)
POC TCO2: 28 MMOL/L (ref 18–33)
POTASSIUM BLD-SCNC: 4.1 MMOL/L (ref 3.6–5.1)
PROTEIN, POC: 6.9 G/DL (ref 6.4–8.1)
SAMPLE: NORMAL
SODIUM BLD-SCNC: 138 MMOL/L (ref 128–145)

## 2019-07-22 PROCEDURE — 85025 COMPLETE CBC W/AUTO DIFF WBC: CPT | Mod: ER

## 2019-07-22 PROCEDURE — 99284 EMERGENCY DEPT VISIT MOD MDM: CPT | Mod: 25,ER

## 2019-07-22 PROCEDURE — 84484 ASSAY OF TROPONIN QUANT: CPT | Mod: ER

## 2019-07-22 PROCEDURE — 93010 ELECTROCARDIOGRAM REPORT: CPT | Mod: ,,, | Performed by: INTERNAL MEDICINE

## 2019-07-22 PROCEDURE — 85379 FIBRIN DEGRADATION QUANT: CPT | Mod: ER

## 2019-07-22 PROCEDURE — 81025 URINE PREGNANCY TEST: CPT | Mod: ER | Performed by: INTERNAL MEDICINE

## 2019-07-22 PROCEDURE — 93010 EKG 12-LEAD: ICD-10-PCS | Mod: ,,, | Performed by: INTERNAL MEDICINE

## 2019-07-22 PROCEDURE — 80053 COMPREHEN METABOLIC PANEL: CPT | Mod: ER

## 2019-07-22 PROCEDURE — 83880 ASSAY OF NATRIURETIC PEPTIDE: CPT | Mod: ER

## 2019-07-22 PROCEDURE — 93005 ELECTROCARDIOGRAM TRACING: CPT | Mod: ER

## 2019-07-22 NOTE — ED PROVIDER NOTES
"Encounter Date: 2019    SCRIBE #1 NOTE: I, Earline Andujar, am scribing for, and in the presence of,  Toussaint Battley, FNP. I have scribed the following portions of the note - Other sections scribed: HPI, ROS, PE.       History     Chief Complaint   Patient presents with    Hypertension     pt reports she started feeling bad when she got out the shower today, felt dizzy and checked her BP it was 152/80. Took herself off of BP meds months ago bc " they weren't giving me what I want' states she needs to find a primary     Mary Lynn Castellano is a 39 y.o. female with HTN, migraine HA, and depression who presents to the ED complaining of "feeling bad", slight SOB lasting 30 min, and dizziness when coming out of the shower today morning.  Pt checked her BP today and it was 152/80.  Pt reports that she took herself off the BP medication several months ago because "they weren't giving me what I wanted".  Pt states that she needs to find a PCP.  Pt reports that she drinks a lot of water.    The history is provided by the patient. No  was used.     Review of patient's allergies indicates:  No Known Allergies  Past Medical History:   Diagnosis Date    Depression     Fibromyalgia     Gall stones     Hypertension     Migraine headache     Obesity     SHEREE (obstructive sleep apnea)      Past Surgical History:   Procedure Laterality Date     SECTION      x 3 with BTL    TUBAL LIGATION      WISDOM TOOTH EXTRACTION       Family History   Problem Relation Age of Onset    Diabetes Father     Hypertension Father     Breast cancer Mother     Depression Mother     Hypertension Mother     Cancer Brother         prostate    Cancer Maternal Grandmother     Hypertension Paternal Grandmother     Ovarian cancer Neg Hx     Colon cancer Neg Hx      Social History     Tobacco Use    Smoking status: Former Smoker     Packs/day: 0.50     Years: 1.00     Pack years: 0.50     Last " attempt to quit: 8/3/2016     Years since quittin.9    Smokeless tobacco: Never Used   Substance Use Topics    Alcohol use: No    Drug use: No     Review of Systems   Constitutional: Negative.  Negative for fever.   HENT: Negative.  Negative for sore throat.    Eyes: Negative.    Respiratory: Negative.  Negative for shortness of breath (resolved).    Cardiovascular: Negative.  Negative for chest pain.   Gastrointestinal: Negative.  Negative for nausea and vomiting.   Endocrine: Negative.    Genitourinary: Negative.  Negative for dysuria.   Musculoskeletal: Negative.  Negative for myalgias.   Skin: Negative.  Negative for rash.   Allergic/Immunologic: Negative.    Neurological: Positive for dizziness. Negative for headaches.   Hematological: Negative.  Negative for adenopathy.   Psychiatric/Behavioral: Negative.  Negative for behavioral problems.   All other systems reviewed and are negative.      Physical Exam     Initial Vitals [19 1603]   BP Pulse Resp Temp SpO2   (!) 157/94 107 20 97.5 °F (36.4 °C) 95 %      MAP       --         Physical Exam    Nursing note and vitals reviewed.  Constitutional: She appears well-developed. She is Obese .   HENT:   Head: Normocephalic and atraumatic.   Right Ear: External ear normal.   Left Ear: External ear normal.   Nose: Nose normal.   Mouth/Throat: Oropharynx is clear and moist.   Eyes: Conjunctivae and EOM are normal. Pupils are equal, round, and reactive to light.   Neck: Normal range of motion and phonation normal. Neck supple.   Cardiovascular: Normal rate, regular rhythm, normal heart sounds and intact distal pulses. Exam reveals no gallop and no friction rub.    No murmur heard.  Pulmonary/Chest: Effort normal and breath sounds normal. No stridor. No respiratory distress. She has no wheezes. She has no rhonchi. She has no rales. She exhibits no tenderness.   Abdominal: Soft. Bowel sounds are normal. She exhibits no distension. There is no tenderness. There  is no rigidity, no rebound and no guarding.   Musculoskeletal: Normal range of motion. She exhibits no edema or tenderness.   Neurological: She is alert and oriented to person, place, and time. She has normal strength. No cranial nerve deficit or sensory deficit. GCS score is 15. GCS eye subscore is 4. GCS verbal subscore is 5. GCS motor subscore is 6.   Skin: Skin is warm and dry. Capillary refill takes less than 2 seconds. No rash noted.   Psychiatric: She has a normal mood and affect. Her behavior is normal.         ED Course   Procedures  Labs Reviewed   TROPONIN ISTAT   POCT URINE PREGNANCY   POCT CBC   POCT CMP   POCT D DIMER   POCT TROPONIN   POCT B-TYPE NATRIURETIC PEPTIDE (BNP)   POCT CMP   POCT B-TYPE NATRIURETIC PEPTIDE (BNP)   POCT D DIMER     EKG Readings: (Independently Interpreted)   Initial Reading: No STEMI. Rhythm: Normal Sinus Rhythm. Heart Rate: 95. Ectopy: No Ectopy. Conduction: Normal. ST Segments: Normal ST Segments. T Waves: Normal. Axis: Normal. Clinical Impression: Normal Sinus Rhythm     ECG Results          EKG 12-lead (In process)  Result time 07/22/19 17:07:10    In process by Interface, Lab In [facility] (07/22/19 17:07:10)                 Narrative:    Test Reason : R00.0,    Vent. Rate : 095 BPM     Atrial Rate : 095 BPM     P-R Int : 126 ms          QRS Dur : 080 ms      QT Int : 350 ms       P-R-T Axes : 000 -05 011 degrees     QTc Int : 439 ms    Normal sinus rhythm  Lateral infarct ,age undetermined  Abnormal ECG  When compared with ECG of 19-AUG-2018 18:29,  No significant change was found    Referred By: [email or web address]   SELF           Confirmed By:                             Imaging Results          X-Ray Chest PA And Lateral (Final result)  Result time 07/22/19 16:35:19    Final result by Guillermo Gordon MD (07/22/19 16:35:19)                 Impression:      No acute cardiopulmonary abnormality.      Electronically signed by: Guillermo  Evan  Date:    07/22/2019  Time:    16:35             Narrative:    EXAMINATION:  XR CHEST PA AND LATERAL    CLINICAL HISTORY:  Personal history of other specified conditions    TECHNIQUE:  PA and lateral views of the chest were performed.    COMPARISON:  Chest PA and lateral dated 08/19/2018    FINDINGS:  Lungs are clear without lobar consolidation, pleural effusion, or pneumothorax.  Cardiomediastinal silhouette is similar.  No acute osseous abnormality.                               CT Head Without Contrast (Final result)  Result time 07/22/19 16:33:45    Final result by Jaye Plummer MD (07/22/19 16:33:45)                 Impression:      No acute abnormality.      Electronically signed by: Jaye Plummer MD  Date:    07/22/2019  Time:    16:33             Narrative:    EXAMINATION:  CT HEAD WITHOUT CONTRAST    CLINICAL HISTORY:  Dizziness;    TECHNIQUE:  Low dose axial CT images obtained throughout the head without intravenous contrast. Sagittal and coronal reconstructions were performed.    COMPARISON:  None.    FINDINGS:  Intracranial compartment:    Ventricles and sulci are normal in size for age without evidence of hydrocephalus. No extra-axial blood or fluid collections.    The brain parenchyma appears normal. No parenchymal mass, hemorrhage, edema or major vascular distribution infarct.    Skull/extracranial contents (limited evaluation): No fracture.  There is a left maxillary mucous retention cyst.                                 Medical Decision Making:   Initial Assessment:   Dehydration, dizziness, tachycardia  Differential Diagnosis:   Intracranial abnormality, orthostasis, electrolyte abnormality  Clinical Tests:   Lab Tests: Ordered and Reviewed  Radiological Study: Ordered and Reviewed  Medical Tests: Ordered and Reviewed  ED Management:  Orthostatic vitals negative. Patient drank approximately 900 mL of water.  Upon reassessment the patient pulse is 88 beats per min with a blood pressure  of 119/69.  Patient denies dizziness.  Imaging is unremarkable.  Lab work is unremarkable.  Patient will be discharged home with instructions to drink plenty of fluids, follow up with primary care return to the ER as needed if symptoms worsen or fail to improve.  The patient verbalized understanding of discharge instruction and treatment plan.            Scribe Attestation:   Scribe #1: I performed the above scribed service and the documentation accurately describes the services I performed. I attest to the accuracy of the note.               Clinical Impression:     1. Dizziness    2. Tachycardia    3. History of shortness of breath    4. Dehydration                                   Toussaint Battley III, [facility]  07/22/19 [id number]

## 2020-01-09 ENCOUNTER — HOSPITAL ENCOUNTER (EMERGENCY)
Facility: HOSPITAL | Age: 40
Discharge: HOME | End: 2020-01-09
Attending: EMERGENCY MEDICINE
Payer: MEDICAID

## 2020-01-09 VITALS
BODY MASS INDEX: 45.99 KG/M2 | RESPIRATION RATE: 18 BRPM | HEIGHT: 67 IN | TEMPERATURE: 98 F | HEART RATE: 85 BPM | OXYGEN SATURATION: 99 % | DIASTOLIC BLOOD PRESSURE: 75 MMHG | SYSTOLIC BLOOD PRESSURE: 123 MMHG | WEIGHT: 293 LBS

## 2020-01-09 DIAGNOSIS — M54.9 ACUTE BACK PAIN, UNSPECIFIED BACK LOCATION, UNSPECIFIED BACK PAIN LATERALITY: ICD-10-CM

## 2020-01-09 DIAGNOSIS — N39.0 ACUTE URINARY TRACT INFECTION: Primary | ICD-10-CM

## 2020-01-09 DIAGNOSIS — R10.9 ABDOMINAL PAIN: ICD-10-CM

## 2020-01-09 DIAGNOSIS — R51.9 ACUTE NONINTRACTABLE HEADACHE, UNSPECIFIED HEADACHE TYPE: ICD-10-CM

## 2020-01-09 LAB
ALBUMIN SERPL-MCNC: 3.6 G/DL (ref 3.3–5.5)
ALBUMIN SERPL-MCNC: 3.6 G/DL (ref 3.3–5.5)
ALP SERPL-CCNC: 73 U/L (ref 42–141)
ALP SERPL-CCNC: 89 U/L (ref 42–141)
B-HCG UR QL: NEGATIVE
BILIRUB SERPL-MCNC: 0.5 MG/DL (ref 0.2–1.6)
BILIRUB SERPL-MCNC: 0.5 MG/DL (ref 0.2–1.6)
BILIRUBIN, POC UA: NEGATIVE
BLOOD, POC UA: ABNORMAL
BUN SERPL-MCNC: 18 MG/DL (ref 7–22)
CALCIUM SERPL-MCNC: 9.4 MG/DL (ref 8–10.3)
CHLORIDE SERPL-SCNC: 106 MMOL/L (ref 98–108)
CLARITY, POC UA: ABNORMAL
COLOR, POC UA: YELLOW
CREAT SERPL-MCNC: 0.7 MG/DL (ref 0.6–1.2)
CTP QC/QA: YES
GLUCOSE SERPL-MCNC: 102 MG/DL (ref 73–118)
GLUCOSE, POC UA: NEGATIVE
KETONES, POC UA: NEGATIVE
LEUKOCYTE EST, POC UA: NEGATIVE
NITRITE, POC UA: POSITIVE
PH UR STRIP: 5.5 [PH]
POC ALT (SGPT): 20 U/L (ref 10–47)
POC ALT (SGPT): 24 U/L (ref 10–47)
POC AMYLASE: 18 U/L (ref 14–97)
POC AST (SGOT): 25 U/L (ref 11–38)
POC AST (SGOT): 29 U/L (ref 11–38)
POC CARDIAC TROPONIN I: 0 NG/ML
POC GGT: 19 U/L (ref 5–65)
POC PTINR: 1 (ref 0.9–1.2)
POC PTWBT: 11.6 SEC (ref 9.7–14.3)
POC TCO2: 29 MMOL/L (ref 18–33)
POCT GLUCOSE: 94 MG/DL (ref 70–110)
POTASSIUM BLD-SCNC: 3.8 MMOL/L (ref 3.6–5.1)
PROTEIN, POC UA: ABNORMAL
PROTEIN, POC: 7 G/DL (ref 6.4–8.1)
PROTEIN, POC: 7.5 G/DL (ref 6.4–8.1)
SAMPLE: NORMAL
SAMPLE: NORMAL
SODIUM BLD-SCNC: 142 MMOL/L (ref 128–145)
SPECIFIC GRAVITY, POC UA: >=1.03
UROBILINOGEN, POC UA: 0.2 E.U./DL

## 2020-01-09 PROCEDURE — 81003 URINALYSIS AUTO W/O SCOPE: CPT | Mod: ER

## 2020-01-09 PROCEDURE — 96375 TX/PRO/DX INJ NEW DRUG ADDON: CPT | Mod: ER

## 2020-01-09 PROCEDURE — 63600175 PHARM REV CODE 636 W HCPCS: Mod: ER | Performed by: EMERGENCY MEDICINE

## 2020-01-09 PROCEDURE — 93005 ELECTROCARDIOGRAM TRACING: CPT | Mod: ER

## 2020-01-09 PROCEDURE — 84484 ASSAY OF TROPONIN QUANT: CPT | Mod: ER

## 2020-01-09 PROCEDURE — 87086 URINE CULTURE/COLONY COUNT: CPT

## 2020-01-09 PROCEDURE — 85025 COMPLETE CBC W/AUTO DIFF WBC: CPT | Mod: ER

## 2020-01-09 PROCEDURE — 85610 PROTHROMBIN TIME: CPT | Mod: ER

## 2020-01-09 PROCEDURE — 80053 COMPREHEN METABOLIC PANEL: CPT | Mod: ER

## 2020-01-09 PROCEDURE — 87077 CULTURE AEROBIC IDENTIFY: CPT

## 2020-01-09 PROCEDURE — 96374 THER/PROPH/DIAG INJ IV PUSH: CPT | Mod: ER

## 2020-01-09 PROCEDURE — 93010 EKG 12-LEAD: ICD-10-PCS | Mod: ,,, | Performed by: INTERNAL MEDICINE

## 2020-01-09 PROCEDURE — 87186 SC STD MICRODIL/AGAR DIL: CPT

## 2020-01-09 PROCEDURE — 96361 HYDRATE IV INFUSION ADD-ON: CPT | Mod: ER

## 2020-01-09 PROCEDURE — 87088 URINE BACTERIA CULTURE: CPT

## 2020-01-09 PROCEDURE — 82150 ASSAY OF AMYLASE: CPT | Mod: ER

## 2020-01-09 PROCEDURE — 81025 URINE PREGNANCY TEST: CPT | Mod: ER | Performed by: EMERGENCY MEDICINE

## 2020-01-09 PROCEDURE — 96372 THER/PROPH/DIAG INJ SC/IM: CPT | Mod: 59,ER

## 2020-01-09 PROCEDURE — 99285 EMERGENCY DEPT VISIT HI MDM: CPT | Mod: 25,ER

## 2020-01-09 PROCEDURE — 93010 ELECTROCARDIOGRAM REPORT: CPT | Mod: ,,, | Performed by: INTERNAL MEDICINE

## 2020-01-09 RX ORDER — PROMETHAZINE HYDROCHLORIDE 25 MG/1
25 TABLET ORAL EVERY 6 HOURS PRN
Qty: 15 TABLET | Refills: 0 | Status: SHIPPED | OUTPATIENT
Start: 2020-01-09 | End: 2021-01-14

## 2020-01-09 RX ORDER — ACETAMINOPHEN 500 MG
1000 TABLET ORAL EVERY 6 HOURS PRN
Qty: 30 TABLET | Refills: 0 | OUTPATIENT
Start: 2020-01-09 | End: 2020-02-13

## 2020-01-09 RX ORDER — KETOROLAC TROMETHAMINE 30 MG/ML
15 INJECTION, SOLUTION INTRAMUSCULAR; INTRAVENOUS
Status: COMPLETED | OUTPATIENT
Start: 2020-01-09 | End: 2020-01-09

## 2020-01-09 RX ORDER — CYCLOBENZAPRINE HCL 10 MG
10 TABLET ORAL 3 TIMES DAILY PRN
Qty: 15 TABLET | Refills: 0 | Status: SHIPPED | OUTPATIENT
Start: 2020-01-09 | End: 2020-01-14

## 2020-01-09 RX ORDER — DIPHENHYDRAMINE HYDROCHLORIDE 50 MG/ML
25 INJECTION INTRAMUSCULAR; INTRAVENOUS
Status: DISCONTINUED | OUTPATIENT
Start: 2020-01-09 | End: 2020-01-09 | Stop reason: HOSPADM

## 2020-01-09 RX ORDER — DIPHENHYDRAMINE HCL 25 MG
25 CAPSULE ORAL EVERY 6 HOURS PRN
Qty: 20 CAPSULE | Refills: 0 | Status: SHIPPED | OUTPATIENT
Start: 2020-01-09 | End: 2021-01-14

## 2020-01-09 RX ORDER — NITROFURANTOIN 25; 75 MG/1; MG/1
100 CAPSULE ORAL 2 TIMES DAILY
Qty: 10 CAPSULE | Refills: 0 | Status: SHIPPED | OUTPATIENT
Start: 2020-01-09 | End: 2020-01-14

## 2020-01-09 RX ORDER — PROCHLORPERAZINE EDISYLATE 5 MG/ML
10 INJECTION INTRAMUSCULAR; INTRAVENOUS ONCE
Status: COMPLETED | OUTPATIENT
Start: 2020-01-09 | End: 2020-01-09

## 2020-01-09 RX ORDER — CEFTRIAXONE 1 G/1
1 INJECTION, POWDER, FOR SOLUTION INTRAMUSCULAR; INTRAVENOUS
Status: COMPLETED | OUTPATIENT
Start: 2020-01-09 | End: 2020-01-09

## 2020-01-09 RX ORDER — IBUPROFEN 600 MG/1
600 TABLET ORAL EVERY 6 HOURS PRN
Qty: 20 TABLET | Refills: 0 | OUTPATIENT
Start: 2020-01-09 | End: 2020-02-13

## 2020-01-09 RX ADMIN — CEFTRIAXONE SODIUM 1 G: 1 INJECTION, POWDER, FOR SOLUTION INTRAMUSCULAR; INTRAVENOUS at 02:01

## 2020-01-09 RX ADMIN — PROCHLORPERAZINE EDISYLATE 10 MG: 5 INJECTION INTRAMUSCULAR; INTRAVENOUS at 02:01

## 2020-01-09 RX ADMIN — SODIUM CHLORIDE 1000 ML: 0.9 INJECTION, SOLUTION INTRAVENOUS at 02:01

## 2020-01-09 RX ADMIN — KETOROLAC TROMETHAMINE 15 MG: 30 INJECTION, SOLUTION INTRAMUSCULAR at 02:01

## 2020-01-09 NOTE — ED TRIAGE NOTES
"Pt states hx of migraine "tho a long time ago" states woke up at 0600 with severe headache and took a Motrin and "went back to bed" Woke up at 1100 with headache and now with epigastric pain. States may be stress- her work hours have changed- states is nauseous now also- no vomiting or fever. No photophobia  "

## 2020-01-09 NOTE — ED PROVIDER NOTES
Encounter Date: 2020    SCRIBE #1 NOTE: I, Zayda Grissom, am scribing for, and in the presence of,  Dr. Catherine Altman. I have scribed the following portions of the note - Other sections scribed: HPI, ROS, PE.       History     Chief Complaint   Patient presents with    Migraine     Pt to ER with c/o headache on and off x 1 week. Pt reports nausea. + photophobia.      Mary Castellano is a 39 y.o. Female with history of SHEREE, HTN and Fibromyalgia who presents to the ED complaining of intermittent sharp back right (7/10) HA x 2 week ago. Reports nausea, photophobia, and generalized (4/10) abdominal pain. Notes intermittent bilateral arm radiating to hand numbness that occurs s/p HA onset. Denies weakness, dizziness, neck pain, SOB and CP. Notes temporarily relief s/p Tylenol and Excedrin. Admits history of Rx Topamax s/p seeing neurologist years ago for SHEREE. Denies owning sleep apnea machine. LMP x 1 week ago lasting 5 days. Mentions PSHx of tubal ligation. Denies current PCP s/p insurance issues. Has NKDA.     The history is provided by the patient. No  was used.     Review of patient's allergies indicates:  No Known Allergies  Past Medical History:   Diagnosis Date    Depression     Fibromyalgia     Gall stones     Hypertension     Migraine headache     Obesity     SHEREE (obstructive sleep apnea)      Past Surgical History:   Procedure Laterality Date     SECTION      x 3 with BTL    TUBAL LIGATION      WISDOM TOOTH EXTRACTION       Family History   Problem Relation Age of Onset    Diabetes Father     Hypertension Father     Breast cancer Mother     Depression Mother     Hypertension Mother     Cancer Brother         prostate    Cancer Maternal Grandmother     Hypertension Paternal Grandmother     Ovarian cancer Neg Hx     Colon cancer Neg Hx      Social History     Tobacco Use    Smoking status: Former Smoker     Packs/day: 0.50     Years: 1.00      Pack years: 0.50     Last attempt to quit: 8/3/2016     Years since quitting: 3.4    Smokeless tobacco: Never Used   Substance Use Topics    Alcohol use: No    Drug use: No     Review of Systems   Constitutional: Negative for fever.   HENT: Negative for sore throat.    Eyes: Positive for photophobia.   Respiratory: Negative for shortness of breath.    Cardiovascular: Negative for chest pain.   Gastrointestinal: Positive for abdominal pain and nausea.   Genitourinary: Negative for dysuria.   Musculoskeletal: Negative for back pain and neck pain.   Skin: Negative for rash.   Neurological: Positive for numbness and headaches. Negative for dizziness and weakness.   Hematological: Does not bruise/bleed easily.   All other systems reviewed and are negative.      Patient gave consent to have physical exam performed.    Physical Exam     Initial Vitals [01/09/20 1406]   BP Pulse Resp Temp SpO2   106/82 90 18 98 °F (36.7 °C) 100 %      MAP       --         Physical Exam    Nursing note and vitals reviewed.  Constitutional: She appears well-developed and well-nourished.   HENT:   Head: Normocephalic and atraumatic.   Right Ear: External ear normal.   Left Ear: External ear normal.   Nose: Nose normal.   Mouth/Throat: Oropharynx is clear and moist.   Eyes: Conjunctivae and EOM are normal. Pupils are equal, round, and reactive to light.   Neck: Normal range of motion and phonation normal. Neck supple.   Cardiovascular: Normal rate, regular rhythm, normal heart sounds and intact distal pulses. Exam reveals no gallop and no friction rub.    No murmur heard.  Pulmonary/Chest: Effort normal and breath sounds normal. No stridor. No respiratory distress. She has no wheezes. She has no rhonchi. She has no rales. She exhibits no tenderness.   Abdominal: Soft. Bowel sounds are normal. She exhibits no distension. There is tenderness in the epigastric area. There is no rigidity, no rebound and no guarding.   Musculoskeletal: Normal  range of motion. She exhibits no edema or tenderness.   Neurological: She is alert and oriented to person, place, and time. She has normal strength. No cranial nerve deficit or sensory deficit. GCS score is 15. GCS eye subscore is 4. GCS verbal subscore is 5. GCS motor subscore is 6.   CN 2-12 intact   Sensation grossly intact   equal and bilaterally intact  Muscle strength 5/5 bilateral upper and lower extremities  Reflexes normal biceps and patella 2/ 4 bilateral upper and lower extremities   Skin: Skin is warm and dry. Capillary refill takes less than 2 seconds. No rash noted.   Psychiatric: She has a normal mood and affect. Her behavior is normal.         ED Course   Procedures  Labs Reviewed   POCT URINALYSIS W/O SCOPE - Abnormal; Notable for the following components:       Result Value    Spec Grav UA >=1.030 (*)     Blood, UA 1+ (*)     Protein, UA 1+ (*)     Nitrite, UA Positive (*)     All other components within normal limits   CULTURE, URINE   TROPONIN ISTAT   POCT URINE PREGNANCY   POCT CBC   POCT GLUCOSE, HAND-HELD DEVICE   POCT GLUCOSE   POCT CMP   POCT PROTIME-INR   POCT TROPONIN   ISTAT PROCEDURE   POCT AMYLASE   POCT CMP   POCT LIVER PANEL             EKG Readings: (Independently Interpreted)   Initial Reading: No STEMI. Axis: Normal. Other Impression: Normal EKG   No STEMI. Rate of 86. Normal Sinus Rhythm. Normal Axis. Normal/Abnormal EKG. QTc normal at 477. When compared to prior EKG date 07/22/2019 rate decreased by 9 bpm.        ECG Results          EKG 12-lead (In process)  Result time 01/09/20 14:39:30    In process by Interface, Lab In [facility] (01/09/20 14:39:30)                 Narrative:    Test Reason : R10.9,    Vent. Rate : 086 BPM     Atrial Rate : 086 BPM     P-R Int : 136 ms          QRS Dur : 088 ms      QT Int : 374 ms       P-R-T Axes : 064 032 027 degrees     QTc Int : 447 ms    Normal sinus rhythm  Normal ECG  When compared with ECG of 22-JUL-2019 16:35,  No significant  change was found    Referred By:             Confirmed By:                             Imaging Results    None          Medical Decision Making:   History:   Old Medical Records: I decided to obtain old medical records.  Differential Diagnosis:   UTI, HA, migraine HA, gastritis, gastroenteritis, dehydration, and pregnancy.  Independently Interpreted Test(s):   I have ordered and independently interpreted X-rays - see prior notes.  I have ordered and independently interpreted EKG Reading(s) - see prior notes  Clinical Tests:   Lab Tests: Ordered and Reviewed  The following lab test(s) were unremarkable: UPT  Radiological Study: Ordered and Reviewed  Medical Tests: Ordered and Reviewed    Chief complaint: HA  Differential diagnosis: UTI, HA, migraine HA, gastritis, gastroenteritis, dehydration, and pregnancy.    Treatment in the ED; PE,  cefTRIAXone injection 1 g, sodium chloride 0.9% bolus 1,000 mL, ketorolac injection 15 mg, diphenhydrAMINE injection 25 mg, and prochlorperazine injection Soln 10 mg.      Patient reports feeling better after treatment in the ER.    Patient tolerating p.o. without difficulty.    Ordered UPT, CBC, CMP, Protime-INR, Troponin, UA, urine culture, and glucose. Ordered X-Ray Chest PA And Lateral. Ordered EKG 12-lead, Diet NPO, Pulse Oximetry Continuous, and Cardiac Monitoring.       Discussed treatment, prescriptions, labs, and imaging results.    Fill and take prescriptions as directed.  Return to the ED if symptoms worsen or do not resolve.   Answered questions and discussed discharge plan.    Patient feels better and is ready for discharge.  Follow up with PCP/specialist in 1 day.            Scribe Attestation:   Scribe #1: I performed the above scribed service and the documentation accurately describes the services I performed. I attest to the accuracy of the note.                I, Dr. Catherine Altman, personally performed the services described in this documentation. This document  was produced by a scribe under my direction and in my presence. All medical record entries made by the scribe were at my direction and in my presence.  I have reviewed the chart and agree that the record reflects my personal performance and is accurate and complete. Catherine Altman DO.     01/09/2020 5:17 PM             Clinical Impression:     1. Acute urinary tract infection    2. Abdominal pain    3. Acute nonintractable headache, unspecified headache type    4. Acute back pain, unspecified back location, unspecified back pain laterality                                Catherine Altman DO  01/09/20 [id number]

## 2020-01-11 LAB — BACTERIA UR CULT: ABNORMAL

## 2020-02-13 ENCOUNTER — HOSPITAL ENCOUNTER (EMERGENCY)
Facility: HOSPITAL | Age: 40
Discharge: HOME | End: 2020-02-13
Attending: INTERNAL MEDICINE
Payer: MEDICAID

## 2020-02-13 VITALS
WEIGHT: 293 LBS | HEIGHT: 66 IN | TEMPERATURE: 98 F | OXYGEN SATURATION: 100 % | RESPIRATION RATE: 18 BRPM | BODY MASS INDEX: 47.09 KG/M2 | SYSTOLIC BLOOD PRESSURE: 109 MMHG | HEART RATE: 82 BPM | DIASTOLIC BLOOD PRESSURE: 64 MMHG

## 2020-02-13 DIAGNOSIS — J06.9 VIRAL URI: Primary | ICD-10-CM

## 2020-02-13 DIAGNOSIS — J30.9 ALLERGIC RHINITIS, UNSPECIFIED SEASONALITY, UNSPECIFIED TRIGGER: ICD-10-CM

## 2020-02-13 DIAGNOSIS — R09.81 NASAL CONGESTION: ICD-10-CM

## 2020-02-13 LAB
B-HCG UR QL: NEGATIVE
BILIRUBIN, POC UA: NEGATIVE
BLOOD, POC UA: ABNORMAL
CLARITY, POC UA: CLEAR
COLOR, POC UA: ABNORMAL
CTP QC/QA: YES
GLUCOSE, POC UA: NEGATIVE
KETONES, POC UA: NEGATIVE
LEUKOCYTE EST, POC UA: NEGATIVE
NITRITE, POC UA: NEGATIVE
PH UR STRIP: 6 [PH]
POC MOLECULAR INFLUENZA A AGN: NEGATIVE
POC MOLECULAR INFLUENZA B AGN: NEGATIVE
POCT GLUCOSE: 110 MG/DL (ref 70–110)
PROTEIN, POC UA: NEGATIVE
S PYO RRNA THROAT QL PROBE: NEGATIVE
SPECIFIC GRAVITY, POC UA: >=1.03
UROBILINOGEN, POC UA: 0.2 E.U./DL

## 2020-02-13 PROCEDURE — 81025 URINE PREGNANCY TEST: CPT | Mod: ER | Performed by: INTERNAL MEDICINE

## 2020-02-13 PROCEDURE — 99284 EMERGENCY DEPT VISIT MOD MDM: CPT | Mod: 25,ER

## 2020-02-13 PROCEDURE — 87502 INFLUENZA DNA AMP PROBE: CPT | Mod: ER

## 2020-02-13 PROCEDURE — 87081 CULTURE SCREEN ONLY: CPT

## 2020-02-13 PROCEDURE — 87880 STREP A ASSAY W/OPTIC: CPT | Mod: ER

## 2020-02-13 PROCEDURE — 81003 URINALYSIS AUTO W/O SCOPE: CPT | Mod: ER

## 2020-02-13 RX ORDER — DEXTROMETHORPHAN HYDROBROMIDE, GUAIFENESIN 5; 100 MG/5ML; MG/5ML
650 LIQUID ORAL EVERY 8 HOURS
Qty: 20 TABLET | Refills: 0 | Status: SHIPPED | OUTPATIENT
Start: 2020-02-13

## 2020-02-13 RX ORDER — AZELASTINE 1 MG/ML
1 SPRAY, METERED NASAL 2 TIMES DAILY
Qty: 30 ML | Refills: 0 | Status: SHIPPED | OUTPATIENT
Start: 2020-02-13 | End: 2021-01-14

## 2020-02-13 RX ORDER — CETIRIZINE HYDROCHLORIDE 10 MG/1
10 TABLET ORAL DAILY
Qty: 30 TABLET | Refills: 0 | Status: SHIPPED | OUTPATIENT
Start: 2020-02-13 | End: 2021-01-14

## 2020-02-13 RX ORDER — IBUPROFEN 600 MG/1
600 TABLET ORAL EVERY 6 HOURS PRN
Qty: 20 TABLET | Refills: 0 | Status: ON HOLD | OUTPATIENT
Start: 2020-02-13 | End: 2020-09-18 | Stop reason: HOSPADM

## 2020-02-13 NOTE — ED PROVIDER NOTES
Encounter Date: 2020    SCRIBE #1 NOTE: I, Josiah Alvarado, am scribing for, and in the presence of,  CHACHO Arreola. I have scribed the following portions of the note - Other sections scribed: HPI, ROS, PE.       History     Chief Complaint   Patient presents with    Sore Throat     face hurts, eyes hurt,   trouble swallowing.       Headache     39 year old female with a sore throat, nasal congestion, and body aches that started yesterday. Patient denies fever, fatigue, chest pain, shortness of breath, abdominal pain, nausea, vomiting, diarrhea, dysuria, hematuria, rash, numbness, weakness, tingling, or any additional complaints.  Patient rates her pain as 5/10 and has not tried any medications for her symptoms.    The history is provided by the patient. No  was used.     Review of patient's allergies indicates:  No Known Allergies  Past Medical History:   Diagnosis Date    Depression     Fibromyalgia     Gall stones     Hypertension     Migraine headache     Obesity     SHEREE (obstructive sleep apnea)      Past Surgical History:   Procedure Laterality Date     SECTION      x 3 with BTL    TUBAL LIGATION      WISDOM TOOTH EXTRACTION       Family History   Problem Relation Age of Onset    Diabetes Father     Hypertension Father     Breast cancer Mother     Depression Mother     Hypertension Mother     Cancer Brother         prostate    Cancer Maternal Grandmother     Hypertension Paternal Grandmother     Ovarian cancer Neg Hx     Colon cancer Neg Hx      Social History     Tobacco Use    Smoking status: Former Smoker     Packs/day: 0.50     Years: 1.00     Pack years: 0.50     Last attempt to quit: 8/3/2016     Years since quitting: 3.5    Smokeless tobacco: Never Used   Substance Use Topics    Alcohol use: No    Drug use: No     Review of Systems   Constitutional: Negative for chills, fatigue and fever.   HENT: Positive for congestion, sinus pressure, sinus  pain, sore throat and trouble swallowing. Negative for ear pain and rhinorrhea.    Eyes: Negative for pain, discharge and redness.   Respiratory: Negative for cough and shortness of breath.    Cardiovascular: Negative for chest pain.   Gastrointestinal: Negative for abdominal pain, diarrhea, nausea and vomiting.   Genitourinary: Negative for decreased urine volume, dysuria and hematuria.   Musculoskeletal: Positive for myalgias. Negative for back pain, neck pain and neck stiffness.   Skin: Negative for rash.   Neurological: Positive for headaches. Negative for dizziness, weakness, light-headedness and numbness.   Psychiatric/Behavioral: Negative for confusion.   All other systems reviewed and are negative.      Physical Exam     Initial Vitals [02/13/20 1327]   BP Pulse Resp Temp SpO2   (!) 149/91 88 18 98.3 °F (36.8 °C) 98 %      MAP       --         Physical Exam    Nursing note and vitals reviewed.  Constitutional: Vital signs are normal. She appears well-developed and well-nourished.  Non-toxic appearance. She does not appear ill.   HENT:   Head: Normocephalic and atraumatic.   Right Ear: Tympanic membrane and external ear normal.   Left Ear: Tympanic membrane and external ear normal.   Nose: Mucosal edema and rhinorrhea present. Right sinus exhibits maxillary sinus tenderness. Left sinus exhibits maxillary sinus tenderness.   Mouth/Throat: Uvula is midline, oropharynx is clear and moist and mucous membranes are normal.   Positive post nasal drip.   Eyes: Conjunctivae are normal.   Neck: Normal range of motion. Neck supple.   Cardiovascular: Normal rate and regular rhythm. Exam reveals no gallop and no friction rub.    No murmur heard.  Pulmonary/Chest: Effort normal and breath sounds normal. No respiratory distress. She has no wheezes. She has no rhonchi. She has no rales. She exhibits no tenderness.   Abdominal: Soft. Bowel sounds are normal. There is no tenderness.   Musculoskeletal: Normal range of motion.    Neurological: She is alert and oriented to person, place, and time. Gait normal. GCS eye subscore is 4. GCS verbal subscore is 5. GCS motor subscore is 6.   Skin: Skin is warm, dry and intact. No rash noted.   Psychiatric: She has a normal mood and affect. Her speech is normal and behavior is normal. Judgment and thought content normal.         ED Course   Procedures  Labs Reviewed   POCT URINALYSIS W/O SCOPE - Abnormal; Notable for the following components:       Result Value    Spec Grav UA >=1.030 (*)     Blood, UA 2+ (*)     All other components within normal limits   CULTURE, STREP A,  THROAT   POCT RAPID STREP A   POCT INFLUENZA A/B MOLECULAR   POCT URINE PREGNANCY   POCT URINALYSIS W/O SCOPE   POCT GLUCOSE          Imaging Results    None          Medical Decision Making:   History:   Old Medical Records: I decided to obtain old medical records.  Clinical Tests:   Lab Tests: Ordered and Reviewed  The following lab test(s) were unremarkable: UPT       APC / Resident Notes:   This is an evaluation of a 39 y.o. female that presents to the Emergency Department for URI symptoms. The patient is a non-toxic, afebrile, and well appearing female. On physical exam: Ears: without infection.  Pharynx without infection. Appears well hydrated with moist mucus membranes. Neck soft and supple with no meningeal signs or cervical lymphadenopathy. Breath sounds are clear and equal bilaterally with no adventitious breath sounds, tachypnea or respiratory distress with room air pulse ox of 98% and no evidence of hypoxia.  Mucosal edema, rhinorrhea, maxillary sinus tenderness    Vital Signs Are Reassuring. RESULTS: UPT negative, Influenza negative, Strep negative, UA negative for infection;     My overall impression is URI, sinus congestion, allergic rhinitis. I considered, but at this time, do not suspect UPT, UTI, OM, OE, strep pharyngitis, meningitis, pneumonia, bacterial sinusitis, or significant dehydration requiring  IV fluids or admission.    D/C Meds: Astelin, Zyrtec, Ibuprofen, Tylenol. D/C Information: Tylenol/Ibuprofen PRN, Hydration. The diagnosis, treatment plan, instructions for follow-up and reevaluation with Primary Care as well as ED return precautions were discussed and understanding was verbalized. All questions or concerns have been addressed.          Scribe Attestation:   Scribe #1: I performed the above scribed service and the documentation accurately describes the services I performed. I attest to the accuracy of the note.    Physician Attestation for Scribe:  Physician Attestation Statement for Scribe: I, CHACHO Arreola, reviewed documentation, as scribed by Josiah Alvarado in my presence, and it is both accurate and complete.                               Clinical Impression:     1. Viral URI    2. Nasal congestion    3. Allergic rhinitis, unspecified seasonality, unspecified trigger            Disposition:   Disposition: Discharged  Condition: Stable                     CHACHO Estrada  02/13/20 154

## 2020-02-15 LAB — BACTERIA THROAT CULT: NORMAL

## 2020-09-16 ENCOUNTER — HOSPITAL ENCOUNTER (INPATIENT)
Facility: HOSPITAL | Age: 40
LOS: 2 days | Discharge: HOME | DRG: 178 | End: 2020-09-18
Attending: EMERGENCY MEDICINE | Admitting: FAMILY MEDICINE
Payer: COMMERCIAL

## 2020-09-16 DIAGNOSIS — R06.02 SHORTNESS OF BREATH: ICD-10-CM

## 2020-09-16 DIAGNOSIS — E66.01 MORBID OBESITY: ICD-10-CM

## 2020-09-16 DIAGNOSIS — U07.1 COVID-19 VIRUS INFECTION: Primary | ICD-10-CM

## 2020-09-16 PROBLEM — G47.33 OSA (OBSTRUCTIVE SLEEP APNEA): Status: ACTIVE | Noted: 2020-09-16

## 2020-09-16 LAB
ALBUMIN SERPL BCP-MCNC: 3.2 G/DL (ref 3.5–5.2)
ALP SERPL-CCNC: 77 U/L (ref 55–135)
ALT SERPL W/O P-5'-P-CCNC: 26 U/L (ref 10–44)
ANION GAP SERPL CALC-SCNC: 8 MMOL/L (ref 8–16)
AST SERPL-CCNC: 24 U/L (ref 10–40)
B-HCG UR QL: NEGATIVE
BASOPHILS # BLD AUTO: 0.01 K/UL (ref 0–0.2)
BASOPHILS NFR BLD: 0.1 % (ref 0–1.9)
BILIRUB SERPL-MCNC: 0.2 MG/DL (ref 0.1–1)
BNP SERPL-MCNC: 13 PG/ML (ref 0–99)
BUN SERPL-MCNC: 14 MG/DL (ref 6–20)
CALCIUM SERPL-MCNC: 8.4 MG/DL (ref 8.7–10.5)
CHLORIDE SERPL-SCNC: 102 MMOL/L (ref 95–110)
CK SERPL-CCNC: 68 U/L (ref 20–180)
CO2 SERPL-SCNC: 26 MMOL/L (ref 23–29)
CREAT SERPL-MCNC: 0.8 MG/DL (ref 0.5–1.4)
CRP SERPL-MCNC: 56.4 MG/L (ref 0–8.2)
CTP QC/QA: YES
CTP QC/QA: YES
DIFFERENTIAL METHOD: ABNORMAL
EOSINOPHIL # BLD AUTO: 0 K/UL (ref 0–0.5)
EOSINOPHIL NFR BLD: 0.3 % (ref 0–8)
ERYTHROCYTE [DISTWIDTH] IN BLOOD BY AUTOMATED COUNT: 15.9 % (ref 11.5–14.5)
EST. GFR  (AFRICAN AMERICAN): >60 ML/MIN/1.73 M^2
EST. GFR  (NON AFRICAN AMERICAN): >60 ML/MIN/1.73 M^2
FERRITIN SERPL-MCNC: 61 NG/ML (ref 20–300)
GLUCOSE SERPL-MCNC: 112 MG/DL (ref 70–110)
HCT VFR BLD AUTO: 37.9 % (ref 37–48.5)
HGB BLD-MCNC: 11.7 G/DL (ref 12–16)
IMM GRANULOCYTES # BLD AUTO: 0.03 K/UL (ref 0–0.04)
IMM GRANULOCYTES NFR BLD AUTO: 0.4 % (ref 0–0.5)
LACTATE SERPL-SCNC: 0.9 MMOL/L (ref 0.5–2.2)
LDH SERPL L TO P-CCNC: 279 U/L (ref 110–260)
LYMPHOCYTES # BLD AUTO: 1.3 K/UL (ref 1–4.8)
LYMPHOCYTES NFR BLD: 18.9 % (ref 18–48)
MCH RBC QN AUTO: 24.7 PG (ref 27–31)
MCHC RBC AUTO-ENTMCNC: 30.9 G/DL (ref 32–36)
MCV RBC AUTO: 80 FL (ref 82–98)
MONOCYTES # BLD AUTO: 0.6 K/UL (ref 0.3–1)
MONOCYTES NFR BLD: 7.9 % (ref 4–15)
NEUTROPHILS # BLD AUTO: 5.1 K/UL (ref 1.8–7.7)
NEUTROPHILS NFR BLD: 72.4 % (ref 38–73)
NRBC BLD-RTO: 0 /100 WBC
PLATELET # BLD AUTO: 196 K/UL (ref 150–350)
PMV BLD AUTO: 10.4 FL (ref 9.2–12.9)
POTASSIUM SERPL-SCNC: 4.3 MMOL/L (ref 3.5–5.1)
PROCALCITONIN SERPL IA-MCNC: 0.04 NG/ML
PROT SERPL-MCNC: 6.9 G/DL (ref 6–8.4)
RBC # BLD AUTO: 4.73 M/UL (ref 4–5.4)
SARS-COV-2 RDRP RESP QL NAA+PROBE: POSITIVE
SODIUM SERPL-SCNC: 136 MMOL/L (ref 136–145)
TROPONIN I SERPL DL<=0.01 NG/ML-MCNC: <0.006 NG/ML (ref 0–0.03)
WBC # BLD AUTO: 7 K/UL (ref 3.9–12.7)

## 2020-09-16 PROCEDURE — 94761 N-INVAS EAR/PLS OXIMETRY MLT: CPT

## 2020-09-16 PROCEDURE — 25000242 PHARM REV CODE 250 ALT 637 W/ HCPCS: Performed by: FAMILY MEDICINE

## 2020-09-16 PROCEDURE — 63600175 PHARM REV CODE 636 W HCPCS: Performed by: EMERGENCY MEDICINE

## 2020-09-16 PROCEDURE — 94640 AIRWAY INHALATION TREATMENT: CPT

## 2020-09-16 PROCEDURE — 25000242 PHARM REV CODE 250 ALT 637 W/ HCPCS: Performed by: EMERGENCY MEDICINE

## 2020-09-16 PROCEDURE — 83615 LACTATE (LD) (LDH) ENZYME: CPT

## 2020-09-16 PROCEDURE — 85025 COMPLETE CBC W/AUTO DIFF WBC: CPT

## 2020-09-16 PROCEDURE — 82728 ASSAY OF FERRITIN: CPT

## 2020-09-16 PROCEDURE — 80053 COMPREHEN METABOLIC PANEL: CPT

## 2020-09-16 PROCEDURE — 99223 1ST HOSP IP/OBS HIGH 75: CPT | Mod: ,,, | Performed by: FAMILY MEDICINE

## 2020-09-16 PROCEDURE — 99900035 HC TECH TIME PER 15 MIN (STAT)

## 2020-09-16 PROCEDURE — 83605 ASSAY OF LACTIC ACID: CPT

## 2020-09-16 PROCEDURE — 27100098 HC SPACER

## 2020-09-16 PROCEDURE — 84145 PROCALCITONIN (PCT): CPT

## 2020-09-16 PROCEDURE — 99285 EMERGENCY DEPT VISIT HI MDM: CPT | Mod: 25

## 2020-09-16 PROCEDURE — 94664 DEMO&/EVAL PT USE INHALER: CPT

## 2020-09-16 PROCEDURE — U0002 COVID-19 LAB TEST NON-CDC: HCPCS | Performed by: EMERGENCY MEDICINE

## 2020-09-16 PROCEDURE — 25000003 PHARM REV CODE 250: Performed by: EMERGENCY MEDICINE

## 2020-09-16 PROCEDURE — 93005 ELECTROCARDIOGRAM TRACING: CPT

## 2020-09-16 PROCEDURE — 99223 PR INITIAL HOSPITAL CARE,LEVL III: ICD-10-PCS | Mod: ,,, | Performed by: FAMILY MEDICINE

## 2020-09-16 PROCEDURE — 84484 ASSAY OF TROPONIN QUANT: CPT

## 2020-09-16 PROCEDURE — 93010 ELECTROCARDIOGRAM REPORT: CPT | Mod: ,,, | Performed by: INTERNAL MEDICINE

## 2020-09-16 PROCEDURE — 96374 THER/PROPH/DIAG INJ IV PUSH: CPT

## 2020-09-16 PROCEDURE — 94799 UNLISTED PULMONARY SVC/PX: CPT

## 2020-09-16 PROCEDURE — 83880 ASSAY OF NATRIURETIC PEPTIDE: CPT

## 2020-09-16 PROCEDURE — 81025 URINE PREGNANCY TEST: CPT | Performed by: EMERGENCY MEDICINE

## 2020-09-16 PROCEDURE — 27000646 HC AEROBIKA DEVICE

## 2020-09-16 PROCEDURE — 63600175 PHARM REV CODE 636 W HCPCS: Performed by: FAMILY MEDICINE

## 2020-09-16 PROCEDURE — 93010 EKG 12-LEAD: ICD-10-PCS | Mod: ,,, | Performed by: INTERNAL MEDICINE

## 2020-09-16 PROCEDURE — 86140 C-REACTIVE PROTEIN: CPT

## 2020-09-16 PROCEDURE — 82550 ASSAY OF CK (CPK): CPT

## 2020-09-16 PROCEDURE — 25000003 PHARM REV CODE 250: Performed by: FAMILY MEDICINE

## 2020-09-16 PROCEDURE — 20600001 HC STEP DOWN PRIVATE ROOM

## 2020-09-16 PROCEDURE — 87040 BLOOD CULTURE FOR BACTERIA: CPT

## 2020-09-16 RX ORDER — DEXAMETHASONE SODIUM PHOSPHATE 4 MG/ML
6 INJECTION, SOLUTION INTRA-ARTICULAR; INTRALESIONAL; INTRAMUSCULAR; INTRAVENOUS; SOFT TISSUE
Status: COMPLETED | OUTPATIENT
Start: 2020-09-16 | End: 2020-09-16

## 2020-09-16 RX ORDER — GLUCAGON 1 MG
1 KIT INJECTION
Status: DISCONTINUED | OUTPATIENT
Start: 2020-09-16 | End: 2020-09-18 | Stop reason: HOSPADM

## 2020-09-16 RX ORDER — LOPERAMIDE HYDROCHLORIDE 2 MG/1
2 CAPSULE ORAL EVERY 6 HOURS PRN
Status: DISCONTINUED | OUTPATIENT
Start: 2020-09-16 | End: 2020-09-18 | Stop reason: HOSPADM

## 2020-09-16 RX ORDER — ASCORBIC ACID 500 MG
500 TABLET ORAL 2 TIMES DAILY
Status: DISCONTINUED | OUTPATIENT
Start: 2020-09-16 | End: 2020-09-18 | Stop reason: HOSPADM

## 2020-09-16 RX ORDER — ACETAMINOPHEN 325 MG/1
650 TABLET ORAL EVERY 8 HOURS PRN
Status: DISCONTINUED | OUTPATIENT
Start: 2020-09-16 | End: 2020-09-18 | Stop reason: HOSPADM

## 2020-09-16 RX ORDER — ACETAMINOPHEN 500 MG
1000 TABLET ORAL
Status: COMPLETED | OUTPATIENT
Start: 2020-09-16 | End: 2020-09-16

## 2020-09-16 RX ORDER — IBUPROFEN 200 MG
16 TABLET ORAL
Status: DISCONTINUED | OUTPATIENT
Start: 2020-09-16 | End: 2020-09-18 | Stop reason: HOSPADM

## 2020-09-16 RX ORDER — ENOXAPARIN SODIUM 100 MG/ML
60 INJECTION SUBCUTANEOUS 2 TIMES DAILY
Status: DISCONTINUED | OUTPATIENT
Start: 2020-09-16 | End: 2020-09-18 | Stop reason: HOSPADM

## 2020-09-16 RX ORDER — IBUPROFEN 200 MG
24 TABLET ORAL
Status: DISCONTINUED | OUTPATIENT
Start: 2020-09-16 | End: 2020-09-18 | Stop reason: HOSPADM

## 2020-09-16 RX ORDER — ONDANSETRON 2 MG/ML
4 INJECTION INTRAMUSCULAR; INTRAVENOUS EVERY 8 HOURS PRN
Status: DISCONTINUED | OUTPATIENT
Start: 2020-09-16 | End: 2020-09-18 | Stop reason: HOSPADM

## 2020-09-16 RX ORDER — CHOLECALCIFEROL (VITAMIN D3) 25 MCG
1000 TABLET ORAL DAILY
Status: DISCONTINUED | OUTPATIENT
Start: 2020-09-16 | End: 2020-09-18 | Stop reason: HOSPADM

## 2020-09-16 RX ORDER — GUAIFENESIN/DEXTROMETHORPHAN 100-10MG/5
10 SYRUP ORAL EVERY 4 HOURS PRN
Status: DISCONTINUED | OUTPATIENT
Start: 2020-09-16 | End: 2020-09-18 | Stop reason: HOSPADM

## 2020-09-16 RX ORDER — ALBUTEROL SULFATE 90 UG/1
2 AEROSOL, METERED RESPIRATORY (INHALATION) EVERY 6 HOURS PRN
Status: DISCONTINUED | OUTPATIENT
Start: 2020-09-16 | End: 2020-09-18 | Stop reason: HOSPADM

## 2020-09-16 RX ORDER — ACETAMINOPHEN 325 MG/1
650 TABLET ORAL EVERY 4 HOURS PRN
Status: DISCONTINUED | OUTPATIENT
Start: 2020-09-16 | End: 2020-09-18 | Stop reason: HOSPADM

## 2020-09-16 RX ORDER — SODIUM CHLORIDE 0.9 % (FLUSH) 0.9 %
10 SYRINGE (ML) INJECTION
Status: DISCONTINUED | OUTPATIENT
Start: 2020-09-16 | End: 2020-09-18 | Stop reason: HOSPADM

## 2020-09-16 RX ORDER — ALBUTEROL SULFATE 90 UG/1
2 AEROSOL, METERED RESPIRATORY (INHALATION) EVERY 6 HOURS
Status: DISCONTINUED | OUTPATIENT
Start: 2020-09-16 | End: 2020-09-18 | Stop reason: HOSPADM

## 2020-09-16 RX ADMIN — DEXAMETHASONE SODIUM PHOSPHATE 6 MG: 4 INJECTION, SOLUTION INTRAMUSCULAR; INTRAVENOUS at 06:09

## 2020-09-16 RX ADMIN — ALBUTEROL SULFATE 2 PUFF: 90 AEROSOL, METERED RESPIRATORY (INHALATION) at 05:09

## 2020-09-16 RX ADMIN — Medication 1000 UNITS: at 11:09

## 2020-09-16 RX ADMIN — ALBUTEROL SULFATE 2 PUFF: 90 AEROSOL, METERED RESPIRATORY (INHALATION) at 07:09

## 2020-09-16 RX ADMIN — ENOXAPARIN SODIUM 60 MG: 60 INJECTION SUBCUTANEOUS at 08:09

## 2020-09-16 RX ADMIN — IPRATROPIUM BROMIDE 2 PUFF: 17 AEROSOL, METERED RESPIRATORY (INHALATION) at 07:09

## 2020-09-16 RX ADMIN — THERA TABS 1 TABLET: TAB at 11:09

## 2020-09-16 RX ADMIN — ENOXAPARIN SODIUM 60 MG: 60 INJECTION SUBCUTANEOUS at 11:09

## 2020-09-16 RX ADMIN — ACETAMINOPHEN 1000 MG: 500 TABLET ORAL at 03:09

## 2020-09-16 RX ADMIN — OXYCODONE HYDROCHLORIDE AND ACETAMINOPHEN 500 MG: 500 TABLET ORAL at 11:09

## 2020-09-16 NOTE — H&P
Hospital Medicine  History and Physical  [facility]      Patient Name: Mary Castellano  MRN:  [id number]  Hospital Medicine Team: Networked reference to record PCT  Connor Calvert MD  Date of Admission:  9/16/2020     Length of Stay:  LOS: 0 days     Principal Problem: Suspected Covid-19 Virus Infection    Chief complaint    SOB, cough    HPI    40yoF PMHx HTN, Morbid obesity, SHEREE (not on home cpap), fibromyalgia, who presented to  ED with Cough/SOB/URI Sx x 1 week, worsening x several days.  found to be COVID positive.  COVID risk factors: works at [employer], multiple teenagers in home. Tmax 101.2 at OSH 9/16/20. Cont to monitor.    Transfer to [facility] per protocol. Doing relatively well upon admission to floor here. Sob with activity such as going to bathroom. Otherwise, stable on room air laying in bed. C/o intermittent cough.      Inflammatory markers: CRP 56, .  Lactate 0.9. trop neg.  CXR clear, EKG ok  THAKKAR, RR to 30s with ambulation, sat 90-92. Dex IV 6mg given in ED.    Review of Systems    Constitutional: Positive for fever, chills, fatigue, poor appetite   HENT: Positive for sore throat, negative for trouble swallowing.    Eyes: Negative for photophobia, visual disturbance.   Respiratory: Positive for cough, shortness of breath  Cardiovascular: Negative for chest pain, palpitations, leg swelling.   Gastrointestinal: neg for diarrhea. Negative for abdominal pain, constipation, nausea, vomiting.   Genitourinary: Negative for dysuria, frequency.   Musculoskeletal: Negative for arthralgias, myalgias.   Skin: Negative for rash  Neurological: Negative for dizziness, syncope, light-headedness.   Psychiatric/Behavioral: Negative for confusion, hallucinations, anxiety    Past Medical History:   Diagnosis Date    Depression     Fibromyalgia     Gall stones     Hypertension     Migraine headache     Obesity     SHEREE (obstructive sleep apnea)      Past Surgical  History:   Procedure Laterality Date     SECTION      x 3 with BTL    TUBAL LIGATION      WISDOM TOOTH EXTRACTION       Family History   Problem Relation Age of Onset    Diabetes Father     Hypertension Father     Breast cancer Mother     Depression Mother     Hypertension Mother     Cancer Brother         prostate    Cancer Maternal Grandmother     Hypertension Paternal Grandmother     Ovarian cancer Neg Hx     Colon cancer Neg Hx      Social History     Socioeconomic History    Marital status:      Spouse name: Not on file    Number of children: 3    Years of education: Not on file    Highest education level: Not on file   Occupational History    Occupation: Manager at [employer]    Social Needs    Financial resource strain: Not on file    Food insecurity     Worry: Not on file     Inability: Not on file    Transportation needs     Medical: Not on file     Non-medical: Not on file   Tobacco Use    Smoking status: Former Smoker     Packs/day: 0.50     Years: 1.00     Pack years: 0.50     Quit date: 8/3/2016     Years since quittin.1    Smokeless tobacco: Never Used   Substance and Sexual Activity    Alcohol use: No    Drug use: No    Sexual activity: Not Currently     Partners: Male     Birth control/protection: See Surgical Hx     Comment: Bilateral Tubal Ligation-   Lifestyle    Physical activity     Days per week: Not on file     Minutes per session: Not on file    Stress: Not on file   Relationships    Social connections     Talks on phone: Not on file     Gets together: Not on file     Attends Sikh service: Not on file     Active member of club or organization: Not on file     Attends meetings of clubs or organizations: Not on file     Relationship status: Not on file   Other Topics Concern    Not on file   Social History Narrative    Not on file       Medications  No current facility-administered medications on file prior to encounter.      Current  Outpatient Medications on File Prior to Encounter   Medication Sig Dispense Refill    acetaminophen (TYLENOL) 650 MG TbSR Take 1 tablet (650 mg total) by mouth every 8 (eight) hours. 20 tablet 0    albuterol 90 mcg/actuation inhaler Inhale 1-2 puffs into the lungs every 6 (six) hours as needed for Wheezing (and cough). Rescue 1 Inhaler 0    azelastine (ASTELIN) 137 mcg (0.1 %) nasal spray 1 spray (137 mcg total) by Nasal route 2 (two) times daily. 30 mL 0    buPROPion (WELLBUTRIN XL) 150 MG TB24 tablet Take 150 mg by mouth once daily.      buPROPion (WELLBUTRIN XL) 300 MG 24 hr tablet Take 300 mg by mouth once daily.      cetirizine (ZYRTEC) 10 MG tablet Take 1 tablet (10 mg total) by mouth once daily. 30 tablet 0    diphenhydrAMINE (BENADRYL) 25 mg capsule Take 1 capsule (25 mg total) by mouth every 6 (six) hours as needed for Insomnia (Take if headache does not resolve). 20 capsule 0    gabapentin (NEURONTIN) 300 MG capsule Take 1 capsule (300 mg total) by mouth 3 (three) times daily. 90 capsule 3    hydroCHLOROthiazide (HYDRODIURIL) 25 MG tablet Take 25 mg by mouth once daily.      ibuprofen (ADVIL,MOTRIN) 600 MG tablet Take 1 tablet (600 mg total) by mouth every 6 (six) hours as needed. 20 tablet 0    lidocaine HCl 2% (XYLOCAINE) 2 % JelP urojet by Mucous Membrane route 2 (two) times daily. Apply to affected area 1 Tube 0    lisinopril 10 MG tablet Take 10 mg by mouth once daily.      metformin (GLUCOPHAGE) 250 MG tablet Take 500 mg by mouth 2 (two) times daily with meals.      metoprolol tartrate (LOPRESSOR) 25 MG tablet Take 25 mg by mouth 2 (two) times daily.      omeprazole (PRILOSEC) 40 MG capsule Take 40 mg by mouth once daily.      ondansetron (ZOFRAN) 4 MG tablet Take 1 tablet (4 mg total) by mouth every 8 (eight) hours as needed for Nausea. 20 tablet 0    promethazine (PHENERGAN) 25 MG tablet Take 1 tablet (25 mg total) by mouth every 6 (six) hours as needed for Nausea (Take if headache  does not resolve.). 15 tablet 0    traMADol (ULTRAM) 50 mg tablet Take 1 tablet (50 mg total) by mouth every 6 (six) hours as needed for Pain. 12 tablet 0    valACYclovir (VALTREX) 1000 MG tablet Take 1 tablet (1,000 mg total) by mouth 3 (three) times daily. 30 tablet 0       Allergies  Patient has no known allergies.    Physical Examination  Temp:  [98.9 °F (37.2 °C)-101.2 °F (38.4 °C)]   Pulse:  []   Resp:  [17-34]   BP: (133-144)/(63-80)   SpO2:  [93 %-97 %]     Gen: NAD, conversant, morbidly obese  Head: NC, AT  Eyes: EOMI  CV: RRR, no M/R/G, no peripheral edema, no JVD  Resp: no increased work of breathing on room air.  GI: Soft, NT, ND, +BS  Ext: MAEW, no c/c/e  Neuro: AAOx3  Psychiatry: Normal mood, normal affect    Laboratory:  Recent Labs   Lab 09/16/20  0506   WBC 7.00   LYMPH 18.9  1.3   HGB 11.7*   HCT 37.9        Recent Labs   Lab 09/16/20  0506      K 4.3      CO2 26   BUN 14   CREATININE 0.8   *   CALCIUM 8.4*     Recent Labs   Lab 09/16/20  0506   ALKPHOS 77   ALT 26   AST 24   ALBUMIN 3.2*   PROT 6.9   BILITOT 0.2      Recent Labs     09/16/20  0506   CRP 56.4*   *   BNP 13   TROPONINI <0.006   LACTATE 0.9       All labs within the last 24 hours were reviewed.     Microbiology:  Lab Results   Component Value Date    [id number] Positive (A) 09/16/2020       Microbiology Results (last 7 days)     ** No results found for the last 168 hours. **            Imaging      No results found for this or any previous visit.    X-Ray Chest 1 View  Narrative: EXAMINATION:  XR CHEST 1 VIEW    CLINICAL HISTORY:  COVID-19    TECHNIQUE:  Single frontal view of the chest was performed.    COMPARISON:  January 9, 2020    FINDINGS:  Single portable chest view is submitted.  There is mild diminished depth of inspiration, and mild rotation, when accounting for these factors the cardiomediastinal silhouette appears appropriate, and there is no evidence for confluent infiltrate or  consolidation, significant pleural effusion or pneumothorax.  Impression: Diminished depth of inspiration without additional radiographic evidence for superimposed acute intrathoracic process.    Electronically signed by: Tommy Young  Date:    09/16/2020  Time:    05:14      All imaging within the last 24 hours was reviewed.       Assessment and Plan:    There are no hospital problems to display for this patient.      COVID-19 Virus Infection  Viral Pneumonia due to COVID-19  - one dose IV dexa in ED. Stable on room air currently. Discuss reordering dexa po as well as remdesivir should we start supp O2.  - COVID-19 testing: covid + 9/16/20  - Isolation: Airborne/Droplet. Surgical mask on patient. Notify Infection Control  - Diagnostics: Trend Q48hrs if stable, more frequently if patient decompensating     Laboratory/Study Frequency Result   CBC Admit & Q48h    CMP Admit & Q48h    Magnesium level Admit    D-dimer Admit & Q48h    Ferritin Admit & Q48h 61   CRP Admit & Q48h 56   CPK Admit & Q24h if elevated    LDH Admit & Q48h 279   Vitamin D Admit    BNP Admit 13   Troponin Admit & Q6h if elevated neg   Glucose-6-phos dehydrogenase Admit    Procalcitonin Admit    Lipid Panel If starting statin    Rapid influenza Admit    Resp Infection Panel Admit if BMT/organ transplant    Legionella Antigen Admit    Sputum Culture Admit    Blood Culture Admit    Urinalysis & Culture Admit    ECG Admit & Q48h if on HCQ    CXR Admit    Lymphopenia, hyponatremia, hyperferritinemia, elevated troponin, elevated d-dimer, age, and medical comorbidities are significant predictors of poor clinical outcome    - Management: Per [facility] COVID Treatment Protocol (4/15/20)    - Monitoring:   - Telemetry & Continuous Pulse Oximetry    - Nutrition:    - Multivitamin PO daily   - Add Boost supplement   - Vitamin D 1000IU daily if deficient   - Ascorbic acid 500mg PO bid    - Supportive Care:   - acetaminophen 650mg PO Q6hr PRN fever/headache   -  "loperamide PRN viral diarrhea   - IVF if indicated, restrictive strategy preferred, no maintenance IV if able   - VTE PPx: enoxaparin or heparin SQ unless contraindicated    - Antibiotics:  - if indications, CXR findings, elevated procal. See protocol for alternatives.   - Discontinue early if low concern for bacterial co-infection   - ceftriaxone 1g Q24h x 5 days  - azithromycin 500mg po x1, then 250mg po daily x 4 days    - Investigational Therapies:   - If patient meets criteria    Acute Hypoxemic Respiratory Failure  - CXR relatively clear  - RT consult via Respiratory Communication for COVID Protocols  - Incentive Spirometer Q4h  - Flutter Valve Q4h  - Continuous Pulse Oximetry  - Goal SpO2 92-96%  - Supplemental O2 via LFNC, VentiMask, or HFNC (see Respiratory Support Oxygen Therapies)  - If wheezing, albuterol INH Q6h scheduled & PRN  - Proning Protocol if patient is a candidate (see [facility] Proning Protocol)   - GCS >13, able to self-prone  - If deterioration, may warrant trial of NIPPV and transfer to neg pressure room or immediate ICU consult    SHEREE  - no home cpap, see how she does overnight and what oxygen needs are with sleep    HTN  - holding home meds, cont to monitor    VTE Ppx  - lovenox 60mg bid    Full code  Cardiac diet       If patient transitions to Comfort-Focused Care, please place "Nurse Communication: End of Life Care, family members allowed to visit, including spouse/partner and adult children [please list names]. Please ask family to visit as a group and leave as a group.         VTE High Risk Prophylaxis:   VTE Risk Mitigation (From admission, onward)    None            High Risk Conditions:  Patient has a condition that poses threat to life and bodily function: Severe Respiratory Distress    Connor Calvert MD  Hospital Medicine          "

## 2020-09-16 NOTE — ED NOTES
Pt c/o SOB and cough x 3 days w/ diarrhea yesterday. Pt is A & O x 3, denies N/V, but admits to fever yesterday. No respiratory distress observed. Skin is warm, dry and pink. RADHA x 3mm. BS- decreased bilat. Abd- SNT. PSM x 4 exts. Will continue to monitor closely.

## 2020-09-16 NOTE — ED NOTES
Report received from MELISSA Almendarez. Pt care assumed. Pt in no distress at present. RR unlabored. Sats 97% on RA. Updated patient on POC. Pt refusing to remove clothing at present. Explained to patient that she is required to put on gown for admission. Pt states she will put on gown after being transferred

## 2020-09-16 NOTE — ED PROVIDER NOTES
Encounter Date: 2020       History     Chief Complaint   Patient presents with    COVID-19 Concerns     Pt c/o SOB, cough, nausea x1 week and diarrhea x1 day. Pt denies any chest pain, vomiting or fever. Pt is noted to be febrile in triage.     Patient presents for evaluation of persistent cough that is been present for nearly 10 days.  Patient states she had mild URI symptoms last week that improved with over-the-counter medications.  For 2 days she felt well except for minimal postnasal drip.  Over the last 2 days her cough is worsened with pleuritic pain and exertional dyspnea.  Tonight she was short of breath at rest secondary is severe coughing.  She states she thinks she was wheezing.  She denies headache.  She admits to nausea without vomiting.  She admits to few episodes of diarrhea over the last 2 days.  She denies abdominal pain.  She admits to muscle aches and headache.  Patient works at a hotel.        Review of patient's allergies indicates:  No Known Allergies  Past Medical History:   Diagnosis Date    Depression     Fibromyalgia     Gall stones     Hypertension     Migraine headache     Obesity     SHEREE (obstructive sleep apnea)      Past Surgical History:   Procedure Laterality Date     SECTION      x 3 with BTL    TUBAL LIGATION      WISDOM TOOTH EXTRACTION       Family History   Problem Relation Age of Onset    Diabetes Father     Hypertension Father     Breast cancer Mother     Depression Mother     Hypertension Mother     Cancer Brother         prostate    Cancer Maternal Grandmother     Hypertension Paternal Grandmother     Ovarian cancer Neg Hx     Colon cancer Neg Hx      Social History     Tobacco Use    Smoking status: Former Smoker     Packs/day: 0.50     Years: 1.00     Pack years: 0.50     Quit date: 8/3/2016     Years since quittin.1    Smokeless tobacco: Never Used   Substance Use Topics    Alcohol use: No    Drug use: No     Review of Systems    Constitutional: Positive for fatigue. Negative for chills, diaphoresis and fever.   HENT: Positive for congestion and postnasal drip. Negative for sore throat.    Eyes: Negative for visual disturbance.   Respiratory: Positive for cough, shortness of breath and wheezing. Negative for stridor.    Cardiovascular: Positive for chest pain.   Gastrointestinal: Positive for diarrhea and nausea. Negative for abdominal pain and vomiting.        No melena.   Genitourinary: Negative for dysuria, flank pain and hematuria.   Musculoskeletal: Negative for back pain.   Skin: Negative for rash.   Neurological: Positive for headaches. Negative for weakness.   Psychiatric/Behavioral: Negative for confusion.   All other systems reviewed and are negative.      Physical Exam     Initial Vitals [09/16/20 0344]   BP Pulse Resp Temp SpO2   134/67 101 (!) 22 (!) 101.2 °F (38.4 °C) 95 %      MAP       --         Physical Exam    Nursing note and vitals reviewed.  Constitutional: She appears well-developed and well-nourished. She is not diaphoretic. No distress.   Morbidly obese   HENT:   Head: Normocephalic and atraumatic.   Nose: Nose normal.   Mouth/Throat: Oropharynx is clear and moist.   Eyes: Conjunctivae and EOM are normal. Pupils are equal, round, and reactive to light. Right eye exhibits no discharge. Left eye exhibits no discharge.   Neck: Normal range of motion. Neck supple. No tracheal deviation present.   Cardiovascular: Regular rhythm and normal heart sounds.   No murmur heard.  Tachycardia   Pulmonary/Chest: No stridor. No respiratory distress. She has wheezes (Mild, diffuse). She has rhonchi (Mild, diffuse). She has rales (Mild, diffuse). She exhibits no tenderness.   Abdominal: Soft. She exhibits no distension. There is no abdominal tenderness. There is no rebound and no guarding.   Musculoskeletal: Normal range of motion. No edema.   Neurological: She is alert and oriented to person, place, and time.   Skin: Skin is warm  and dry. No pallor.   Psychiatric: She has a normal mood and affect. Her behavior is normal. Judgment and thought content normal.         ED Course   Procedures  Labs Reviewed   SARS-COV-2 RDRP GENE - Abnormal; Notable for the following components:       Result Value    POC Rapid COVID Positive (*)     All other components within normal limits   CBC W/ AUTO DIFFERENTIAL   COMPREHENSIVE METABOLIC PANEL   C-REACTIVE PROTEIN   FERRITIN   LACTATE DEHYDROGENASE   CK   LACTIC ACID, PLASMA   TROPONIN I   PROCALCITONIN   B-TYPE NATRIURETIC PEPTIDE   POCT URINE PREGNANCY     EKG Readings: (Independently Interpreted)   Initial Reading: No STEMI. Rhythm: Normal Sinus Rhythm. Heart Rate: 82. Ectopy: No Ectopy. Conduction: Normal. ST Segments: Normal ST Segments. T Waves: Normal. Clinical Impression: Normal Sinus Rhythm       Imaging Results          X-Ray Chest 1 View (In process)                  Medical Decision Making:   Initial Assessment:   Patient presents for evaluation is URI symptoms with fever.  Patient works at a hotel.  She is concerned she may have COVID.  Complains of shortness of breath at rest tonight.  Patient has had exertional dyspnea for 2 days.  Pulmonary exam reveals crackles, rhonchi, and mild wheezing.  Oxygen saturations are % on room air.  Patient is mildly tachypneic.  Patient is febrile in the emergency room.  Remainder physical exam is normal.  Will do COVID test.  Will get chest x-ray.  Differential Diagnosis:   COVID, pneumonia, bronchitis  Clinical Tests:   Lab Tests: Ordered and Reviewed  The following lab test(s) were unremarkable: CBC, CMP, Lactate, Troponin and BNP  Radiological Study: Ordered and Reviewed  Medical Tests: Ordered and Reviewed  ED Management:  0550:  Patient's oxygen saturations dropped to 92% on room air with minimal exertion.  Patient was significantly dyspneic.  Patient appears uncomfortable at rest.  Inflammatory markers are not markedly elevated.  Chest x-ray shows  minimal changes consistent with COVID infection.  Given patient's comorbidity of morbid obesity and worsening dyspnea over the last 24 hr of believe she would benefit from admission for IV steroids and possibly antivirals.  Will discuss with Main [facility] for admission to the COVID unit.  Discussed plan with patient and family.                             Clinical Impression:       ICD-10-CM ICD-9-CM   1. COVID-19 virus infection  U07.1    2. Shortness of breath  R06.02 786.05   3. Morbid obesity  E66.01 278.01         Disposition:   Disposition: Transferred  Condition: Garret Dave MD  09/16/20 0557

## 2020-09-16 NOTE — PLAN OF CARE
(Physician in Lead of Transfers)   Outside Transfer Acceptance Note / Regional Referral Center    Transferring Physician: Evgeny MEJIA    Accepting Physician: Cierra Monson MD    Date of Acceptance: 09/16/2020    Transferring Facility:  ED    Reason for Transfer: COVID infection, transfer to [facility] per protocol    Report from Transferring Physician/Hospital course: 40F with HTN, obesity, SHEREE, fibromyalgia, who presented to  ED with Cough/SOB/URI Sx x 1 week, worsening x several days.  COVID positive.  COVID risk factors: works at [employer], multiple teenagers in home    Inflammatory markers: CRP 56, .  Lactate 0.9. trop neg.  CXR clear, EKG ok  THAKKAR, RR to 30s with ambulation, sat 90-92  Dex 6mg given    VS: see above and Epic    Labs: see above. Microcytic anemia - Hb 11.7, hct 37.9, plts 196    Radiographs: see above    To Do List:   COVID protocols    Upon patient arrival to floor, please send SecureChat to [facility] P or call extension [phone] (if no answer, this will flip to a beeper, so enter your call back number) for Hospital Medicine admit team assignment and for additional admit orders for the patient.  Do not page the attending physician associated with the patient on arrival (this physician may not be on duty at the time of arrival).  Rather, always call [phone] to reach the triage physician for orders and team assignment.    Cierra Monson MD  Hospital Medicine Staff  Cell: [phone]

## 2020-09-16 NOTE — NURSING
Pt arrived to [address]. Stable condition, no complaints, just wants to know when she can eat. Hospital team paged to inform of arrival. Waiting new orders.

## 2020-09-16 NOTE — PLAN OF CARE
Problem: Adult Inpatient Plan of Care  Goal: Plan of Care Review  Outcome: Ongoing, Progressing  Goal: Patient-Specific Goal (Individualization)  Outcome: Ongoing, Progressing  Goal: Absence of Hospital-Acquired Illness or Injury  Outcome: Ongoing, Progressing  Goal: Optimal Comfort and Wellbeing  Outcome: Ongoing, Progressing  Goal: Readiness for Transition of Care  Outcome: Ongoing, Progressing  Goal: Rounds/Family Conference  Outcome: Ongoing, Progressing     Problem: Bariatric Environmental Safety  Goal: Safety Maintained with Care  Outcome: Ongoing, Progressing

## 2020-09-17 LAB
ALBUMIN SERPL BCP-MCNC: 3.3 G/DL (ref 3.5–5.2)
ALP SERPL-CCNC: 72 U/L (ref 55–135)
ALT SERPL W/O P-5'-P-CCNC: 27 U/L (ref 10–44)
ANION GAP SERPL CALC-SCNC: 10 MMOL/L (ref 8–16)
AST SERPL-CCNC: 26 U/L (ref 10–40)
BASOPHILS # BLD AUTO: 0.02 K/UL (ref 0–0.2)
BASOPHILS NFR BLD: 0.3 % (ref 0–1.9)
BILIRUB SERPL-MCNC: 0.2 MG/DL (ref 0.1–1)
BUN SERPL-MCNC: 15 MG/DL (ref 6–20)
CALCIUM SERPL-MCNC: 8.8 MG/DL (ref 8.7–10.5)
CHLORIDE SERPL-SCNC: 106 MMOL/L (ref 95–110)
CO2 SERPL-SCNC: 26 MMOL/L (ref 23–29)
CREAT SERPL-MCNC: 0.8 MG/DL (ref 0.5–1.4)
D DIMER PPP IA.FEU-MCNC: 0.38 MG/L FEU
DIFFERENTIAL METHOD: ABNORMAL
EOSINOPHIL # BLD AUTO: 0 K/UL (ref 0–0.5)
EOSINOPHIL NFR BLD: 0.1 % (ref 0–8)
ERYTHROCYTE [DISTWIDTH] IN BLOOD BY AUTOMATED COUNT: 15.9 % (ref 11.5–14.5)
ERYTHROCYTE [SEDIMENTATION RATE] IN BLOOD BY WESTERGREN METHOD: 78 MM/HR (ref 0–36)
EST. GFR  (AFRICAN AMERICAN): >60 ML/MIN/1.73 M^2
EST. GFR  (NON AFRICAN AMERICAN): >60 ML/MIN/1.73 M^2
GLUCOSE SERPL-MCNC: 119 MG/DL (ref 70–110)
HCT VFR BLD AUTO: 39.4 % (ref 37–48.5)
HGB BLD-MCNC: 12.1 G/DL (ref 12–16)
IMM GRANULOCYTES # BLD AUTO: 0.04 K/UL (ref 0–0.04)
IMM GRANULOCYTES NFR BLD AUTO: 0.6 % (ref 0–0.5)
LYMPHOCYTES # BLD AUTO: 1.9 K/UL (ref 1–4.8)
LYMPHOCYTES NFR BLD: 27 % (ref 18–48)
MAGNESIUM SERPL-MCNC: 2.2 MG/DL (ref 1.6–2.6)
MCH RBC QN AUTO: 25.1 PG (ref 27–31)
MCHC RBC AUTO-ENTMCNC: 30.7 G/DL (ref 32–36)
MCV RBC AUTO: 82 FL (ref 82–98)
MONOCYTES # BLD AUTO: 0.7 K/UL (ref 0.3–1)
MONOCYTES NFR BLD: 10.2 % (ref 4–15)
NEUTROPHILS # BLD AUTO: 4.3 K/UL (ref 1.8–7.7)
NEUTROPHILS NFR BLD: 61.8 % (ref 38–73)
NRBC BLD-RTO: 0 /100 WBC
PHOSPHATE SERPL-MCNC: 3.8 MG/DL (ref 2.7–4.5)
PLATELET # BLD AUTO: 222 K/UL (ref 150–350)
PMV BLD AUTO: 11.2 FL (ref 9.2–12.9)
POTASSIUM SERPL-SCNC: 3.6 MMOL/L (ref 3.5–5.1)
PROT SERPL-MCNC: 6.9 G/DL (ref 6–8.4)
RBC # BLD AUTO: 4.83 M/UL (ref 4–5.4)
SODIUM SERPL-SCNC: 142 MMOL/L (ref 136–145)
WBC # BLD AUTO: 6.93 K/UL (ref 3.9–12.7)

## 2020-09-17 PROCEDURE — 25000242 PHARM REV CODE 250 ALT 637 W/ HCPCS: Performed by: FAMILY MEDICINE

## 2020-09-17 PROCEDURE — 94799 UNLISTED PULMONARY SVC/PX: CPT

## 2020-09-17 PROCEDURE — 94640 AIRWAY INHALATION TREATMENT: CPT

## 2020-09-17 PROCEDURE — 84100 ASSAY OF PHOSPHORUS: CPT

## 2020-09-17 PROCEDURE — 25000003 PHARM REV CODE 250: Performed by: FAMILY MEDICINE

## 2020-09-17 PROCEDURE — 85379 FIBRIN DEGRADATION QUANT: CPT

## 2020-09-17 PROCEDURE — 20600001 HC STEP DOWN PRIVATE ROOM

## 2020-09-17 PROCEDURE — 63600175 PHARM REV CODE 636 W HCPCS: Performed by: FAMILY MEDICINE

## 2020-09-17 PROCEDURE — 36415 COLL VENOUS BLD VENIPUNCTURE: CPT

## 2020-09-17 PROCEDURE — 94664 DEMO&/EVAL PT USE INHALER: CPT

## 2020-09-17 PROCEDURE — 94761 N-INVAS EAR/PLS OXIMETRY MLT: CPT

## 2020-09-17 PROCEDURE — 85652 RBC SED RATE AUTOMATED: CPT

## 2020-09-17 PROCEDURE — 80053 COMPREHEN METABOLIC PANEL: CPT

## 2020-09-17 PROCEDURE — 99233 PR SUBSEQUENT HOSPITAL CARE,LEVL III: ICD-10-PCS | Mod: ,,, | Performed by: HOSPITALIST

## 2020-09-17 PROCEDURE — 99900035 HC TECH TIME PER 15 MIN (STAT)

## 2020-09-17 PROCEDURE — 83735 ASSAY OF MAGNESIUM: CPT

## 2020-09-17 PROCEDURE — 99233 SBSQ HOSP IP/OBS HIGH 50: CPT | Mod: ,,, | Performed by: HOSPITALIST

## 2020-09-17 PROCEDURE — 85025 COMPLETE CBC W/AUTO DIFF WBC: CPT

## 2020-09-17 RX ADMIN — OXYCODONE HYDROCHLORIDE AND ACETAMINOPHEN 500 MG: 500 TABLET ORAL at 08:09

## 2020-09-17 RX ADMIN — IPRATROPIUM BROMIDE 2 PUFF: 17 AEROSOL, METERED RESPIRATORY (INHALATION) at 07:09

## 2020-09-17 RX ADMIN — Medication 1000 UNITS: at 08:09

## 2020-09-17 RX ADMIN — GUAIFENESIN AND DEXTROMETHORPHAN 10 ML: 100; 10 SYRUP ORAL at 08:09

## 2020-09-17 RX ADMIN — ENOXAPARIN SODIUM 60 MG: 60 INJECTION SUBCUTANEOUS at 08:09

## 2020-09-17 RX ADMIN — ALBUTEROL SULFATE 2 PUFF: 90 AEROSOL, METERED RESPIRATORY (INHALATION) at 07:09

## 2020-09-17 RX ADMIN — IPRATROPIUM BROMIDE 2 PUFF: 17 AEROSOL, METERED RESPIRATORY (INHALATION) at 01:09

## 2020-09-17 RX ADMIN — ALBUTEROL SULFATE 2 PUFF: 90 AEROSOL, METERED RESPIRATORY (INHALATION) at 01:09

## 2020-09-17 RX ADMIN — THERA TABS 1 TABLET: TAB at 08:09

## 2020-09-17 NOTE — PLAN OF CARE
This CM called and spoke with the patient in room 48868 to complete the discharge planning assessment. Per patient she lives alone with her children in an apartment on the 1st level - no steps to point of entry.  Ms. Castellano is independent with all ADLs, drives, not on dialysis or blood thinners, DOES NOT use DME or other in home assistive devices.  She uses in [place] for delivery of groceries to her door. She states she has resources availabel for all daily and prescriptive needs. Upon discharge, she will have help from he children and other family in the area. CM provided direct contact number, answered all questions.  Will follow for course of hospitalization.    9/16/2020  3:47 AM   Morbid obesity [E66.01]  Shortness of breath [R06.02]  COVID-19 virus infection [U07.1]  COVID-19 virus infection [U07.1]  COVID-19 virus infection [U07.1]          PCP:  Dr Diya Worrell @ [facility] [place] -                  [phone]    Pharmacy:  [id number] - [place], [place] - [address] [facility] [address] AT [facility] [address]  [address]  [facility] [address]  Phone: [phone] Fax: [phone]    Emergency Contacts:  Extended Emergency Contact Information  Primary Emergency Contact: Catherine Medina   [facility]  Home Phone: [phone]  Relation: Sister    Insurance:  Payor: MEDICAID / Plan: [facility]) / Product Type: Managed Medicaid /       Ana Cleaning RN  Case Management  Ext: [phone]       09/17/20 1229   Discharge Assessment   Assessment Type Discharge Planning Assessment   Confirmed/corrected address and phone number on facesheet? Yes   Assessment information obtained from? Patient   Expected Length of Stay (days) 4   Communicated expected length of stay with patient/caregiver yes   Prior to hospitilization cognitive status: Alert/Oriented;No Deficits   Prior to hospitalization functional status: Independent   Current cognitive  status: Alert/Oriented;No Deficits   Current Functional Status: Independent   Facility Arrived From:  ED   Lives With child(rona), adult;child(rona), dependent   Able to Return to Prior Arrangements yes   Is patient able to care for self after discharge? Yes   Who are your caregiver(s) and their phone number(s)? Catherine Medina Sister [phone]   Patient's perception of discharge disposition home or selfcare   Readmission Within the Last 30 Days no previous admission in last 30 days   Patient currently being followed by outpatient case management? No   Patient currently receives any other outside agency services? No   Equipment Currently Used at Home none   Do you have any problems affording any of your prescribed medications? No   Is the patient taking medications as prescribed? yes   Does the patient have transportation home? Yes   Transportation Anticipated family or friend will provide   Dialysis Name and Scheduled days N/A   Does the patient receive services at the Coumadin Clinic? No   Discharge Plan A Home   Discharge Plan B Home with family   DME Needed Upon Discharge  none   Patient/Family in Agreement with Plan yes

## 2020-09-17 NOTE — PLAN OF CARE
Problem: Adult Inpatient Plan of Care  Goal: Plan of Care Review  Outcome: Ongoing, Progressing  Poc reviewed with pt. Verbalized understanding. Pt questioning why she is not still on steroids. Informed of qualifications for continued steroids in the hospital. Verbalized understaning.  Goal: Optimal Comfort and Wellbeing  Outcome: Ongoing, Progressing  Pt able to shower herself.   All needs provided to pt.    Problem: Bariatric Environmental Safety  Goal: Safety Maintained with Care  Outcome: Ongoing, Progressing  Pt aware of weight problem. Does not wish to change habits at this time. Pt ordered applebees tonight for dinner. Pt able to take care of self. All needs provided.

## 2020-09-17 NOTE — PROGRESS NOTES
Hospital Medicine  Progress Note  [facility]      Patient Name: Mary Castellano  MRN:  [id number]  Hospital Medicine Team: [facility] MED JOHN Mtz MD  Date of Admission:  9/16/2020     Length of Stay:  LOS: 1 day     Principal Problem:  Morbid obesity with BMI of 70 and over, adult      HPI: 40yoF PMHx HTN, Morbid obesity, SHEREE (not on home cpap), fibromyalgia, who presented to  ED with Cough/SOB/URI Sx x 1 week, worsening x several days.  found to be COVID positive.  COVID risk factors: works at [employer], multiple teenagers in home. Tmax 101.2 at OSH 9/16/20. Cont to monitor. Transfer to [facility] per protocol. Doing relatively well upon admission to floor here. Sob with activity such as going to bathroom. Otherwise, stable on room air laying in bed. C/o intermittent cough. Inflammatory markers: CRP 56, .  Lactate 0.9. trop neg.  CXR clear, EKG ok. THAKKAR, RR to 30s with ambulation, sat 90-92. Dex IV 6mg given in ED    Hospital Course:  Patient admitted for evaluation of acute respiratory failure with hypoxia secondary to COVID-19.    Interval History:     Patient noted to be hemodynamically stable. Overnight, remained stable on room air.  Underwent a 6 min walk test today were oxygen dropped down to 91% on room air.  She was noted to be slightly tachycardic with ambulation. Patient continues to complain of some shortness of breath, especially dyspnea on exertion.  Has not been hypoxic, has not been started on Remdesivir or dexamethasone for that reason.  Patient continues to have cough with minimal sputum production.  Encourage use of incentive spirometer, albuterol inhaler, p.r.n. antitussives.  Attempt to get out of bed, sit up in chair and walk around the room if possible.    Review of Systems:  ROS (Positive in Bold, otherwise negative)  Constitutional: fever, chills, night sweats, malaise, weakness  CV: chest pain, edema, palpitations  Resp: SOB, cough, sputum  production  GI: changes in appetite, NVDC, pain, melena, hematochezia, GERD, hematemesis  : Dysuria, hematuria, urinary urgency, frequency  MSK: arthralgia/myalgia, joint swelling  Neuro/Psych: anxiety, depression    Inpatient Medications:    Current Facility-Administered Medications:     acetaminophen tablet 650 mg, 650 mg, Oral, Q4H PRN, Connor Calvert MD    acetaminophen tablet 650 mg, 650 mg, Oral, Q8H PRN, Connor Calvert MD    albuterol inhaler 2 puff, 2 puff, Inhalation, Q6H PRN, Connor Calvert MD, 2 puff at 09/16/20 0548    albuterol inhaler 2 puff, 2 puff, Inhalation, Q6H, 2 puff at 09/17/20 1331 **AND** MDI Q6H, , , Q6H, Connor Calvert MD    ascorbic acid (vitamin C) tablet 500 mg, 500 mg, Oral, BID, Connor Calvert MD, 500 mg at 09/17/20 0844    dextromethorphan-guaifenesin  mg/5 ml liquid 10 mL, 10 mL, Oral, Q4H PRN, Connor Calvert MD, 10 mL at 09/17/20 0857    dextrose 50% injection 12.5 g, 12.5 g, Intravenous, PRN, Connor Calvert MD    dextrose 50% injection 25 g, 25 g, Intravenous, PRN, Connor Calvert MD    enoxaparin injection 60 mg, 60 mg, Subcutaneous, BID, Connor Calvert MD, 60 mg at 09/17/20 0844    glucagon (human recombinant) injection 1 mg, 1 mg, Intramuscular, PRN, Connor Calvert MD    glucose chewable tablet 16 g, 16 g, Oral, PRN, Connor Calvert MD    glucose chewable tablet 24 g, 24 g, Oral, PRN, Connor Calvert MD    ipratropium inhaler 2 puff, 2 puff, Inhalation, Q6H, 2 puff at 09/17/20 1332 **AND** MDI Q6H, , , Q6H, Connor Calvert MD    loperamide capsule 2 mg, 2 mg, Oral, Q6H PRN, Connor Calvert MD    multivitamin tablet, 1 tablet, Oral, Daily, Connor Calvert MD, 1 tablet at 09/17/20 0844    ondansetron injection 4 mg, 4 mg, Intravenous, Q8H PRN, Connor Calvert MD    sodium chloride 0.9% flush 10 mL, 10 mL, Intravenous, PRN, Connor Calvert MD    vitamin D 1000 units tablet 1,000 Units, 1,000 Units, Oral, Daily, Connor Calvert,  "MD, 1,000 Units at 09/17/20 0844      Physical Exam:    No intake or output data in the 24 hours ending 09/17/20 1536  Wt Readings from Last 3 Encounters:   09/16/20 (!) 208.7 kg (460 lb)   02/13/20 (!) 201.9 kg (445 lb)   01/09/20 (!) 188.2 kg (415 lb)       /68 (BP Location: Right arm, Patient Position: Lying)   Pulse 74   Temp 98.9 °F (37.2 °C) (Oral)   Resp 20   Ht 5' 7" (1.702 m)   Wt (!) 208.7 kg (460 lb)   SpO2 100%   Breastfeeding No   BMI 72.05 kg/m²     GEN: NAD, conversant  Resp: coarse bilateral breath sounds, no wheezes or rales, normal work of breathing   CV: RRR, no m/r/g, no edema  GI: soft, NTND  Skin: no rash    Laboratory:  Lab Results   Component Value Date    [id number] Positive (A) 09/16/2020       Recent Labs   Lab 09/16/20  0506 09/17/20  0651   WBC 7.00 6.93   LYMPH 18.9  1.3 27.0  1.9   HGB 11.7* 12.1   HCT 37.9 39.4    222     Recent Labs   Lab 09/16/20  0506 09/17/20  0651    142   K 4.3 3.6    106   CO2 26 26   BUN 14 15   CREATININE 0.8 0.8   * 119*   CALCIUM 8.4* 8.8   MG  --  2.2   PHOS  --  3.8     Recent Labs   Lab 09/16/20  0506 09/17/20  0651   ALKPHOS 77 72   ALT 26 27   AST 24 26   ALBUMIN 3.2* 3.3*   PROT 6.9 6.9   BILITOT 0.2 0.2        Recent Labs     09/16/20  0506 09/17/20  0651   DDIMER  --  0.38   FERRITIN 61  --    CRP 56.4*  --    *  --    BNP 13  --    TROPONINI <0.006  --        All labs within the last 24 hours were reviewed.     Microbiology:  Microbiology Results (last 7 days)     Procedure Component Value Units Date/Time    Blood culture (site 2) [[id number]] Collected: 09/16/20 1118    Order Status: Completed Specimen: Blood Updated: 09/17/20 1412     Blood Culture, Routine No Growth to date      No Growth to date    Narrative:      Site # 2, aerobic only    Blood culture (site 1) [[id number]] Collected: 09/16/20 1118    Order Status: Completed Specimen: Blood Updated: 09/17/20 1412     Blood Culture, Routine No " Growth to date      No Growth to date    Narrative:      Site # 1, aerobic and anaerobic            Imaging  ECG Results          EKG 12-lead (Final result)  Result time 09/17/20 13:34:45    Final result by Interface, Lab In [facility] (09/17/20 13:34:45)                 Narrative:    Test Reason : R68.89,    Vent. Rate : 082 BPM     Atrial Rate : 082 BPM     P-R Int : 138 ms          QRS Dur : 082 ms      QT Int : 354 ms       P-R-T Axes : 039 012 020 degrees     QTc Int : 413 ms    Normal sinus rhythm  Normal ECG  When compared with ECG of 09-JAN-2020 14:31,  No significant change was found  Confirmed by Andrey Arzate MD ([id number]) on 9/17/2020 1:34:32 PM    Referred By: FARIHA   SELF           Confirmed By:Andrey Arzate MD                              No results found for this or any previous visit.    X-Ray Chest 1 View  Narrative: EXAMINATION:  XR CHEST 1 VIEW    CLINICAL HISTORY:  COVID-19    TECHNIQUE:  Single frontal view of the chest was performed.    COMPARISON:  January 9, 2020    FINDINGS:  Single portable chest view is submitted.  There is mild diminished depth of inspiration, and mild rotation, when accounting for these factors the cardiomediastinal silhouette appears appropriate, and there is no evidence for confluent infiltrate or consolidation, significant pleural effusion or pneumothorax.  Impression: Diminished depth of inspiration without additional radiographic evidence for superimposed acute intrathoracic process.    Electronically signed by: Tommy Young  Date:    09/16/2020  Time:    05:14      All imaging within the last 24 hours was reviewed.     Assessment and Plan:    Active Hospital Problems    Diagnosis  POA    *Morbid obesity with BMI of 70 and over, adult [E66.01, Z68.45]  Not Applicable    COVID-19 virus infection [U07.1]  Yes    SHEREE (obstructive sleep apnea) [G47.33]  Yes      Resolved Hospital Problems   No resolved problems to display.       COVID-19 Virus Infection:  Viral  Pneumonia due to COVID-19:  -Pt tested for COVID-19 and noted to be positive on 9/16  -Isolation: Airborne/Droplet. Surgical mask on patient. Notify Infection Control  -Management: per [facility] COVID Treatment Protocol (4/15/20)  -Monitoring: Telemetry & Continuous Pulse Oximetry  -Antibiotics: started on ceftriaxone 1g Q24h x 5 days and azithromycin 500mg po x1, then 250mg po daily x 4 days  -Nutrition:    -Multivitamin PO daily   -Add Boost supplement   -Vitamin D 1000IU daily if deficient   -Ascorbic acid 500mg PO bid  -Supportive Care:   -Acetaminophen 650mg PO Q6hr PRN fever/headache   -Loperamide PRN viral diarrhea   -Robitussin, tessalon perls for cough   -IVF if indicated, restrictive strategy preferred, no maintenance IV if able  -no dexamethasone Remdesivir for now as patient is not hypoxic     Acute Hypoxemic Respiratory Failure  -currently stable on room air  -RT consult via Respiratory Communication for COVID Protocols  -If wheezing, albuterol INH Q6h scheduled & PRN  -Incentive Spirometer Q4h  -Flutter Valve Q4h  -Continuous pulse oximetry; titrate oxygen to keep sats between 92-96%  -Wean off O2 as tolerated    -Supplemental O2 via LFNC, VentiMask, or HFNC (see Respiratory Support Oxygen Therapies)  -Proning Protocol if patient is a candidate with GCS >13 and able to self-prone (see  Proning Protocol)  -If deterioration, may warrant trial of NIPPV and transfer to neg pressure room or immediate ICU consult     SHEREE  -no home cpap, see how she does overnight and what oxygen needs are with sleep     HTN  -holding home meds, cont to monitor     Goals of care, counseling/discussion  -Reviewed the typical clinical course of COVID19 with patient, including the potential for acute decompensation requiring intubation and mechanical ventilation  -Discussed again as part of routine daily evaluation, patient/POA maintains code status of full    VTE High Risk Prophylaxis: enoxaparin 40mg sq QHS @ 2100 (bundled  care) if GFR >30  Dispo: DC home once medically ready.  Monitor to ensure she does not developed hypoxia, especially due to body habitus.      Subsequent Hospital Care:   Level 3 13245 Total visit time was 35 minutes or greater with greater than 50% of time spent in counseling and coordination of care.     Suzy Mtz MD  9/17/2020   Department of Hospital medicine

## 2020-09-17 NOTE — CONSULTS
Wound care consult received. Consult was automatic through [facility] due to the patient's weight. No wound care needs noted per nursing.  Will sign off and nursing to contionue care.   Jcainta GARCIA, BSN, RN, COCN, [facility]  [phone]

## 2020-09-17 NOTE — PROGRESS NOTES
Home Oxygen Evaluation    Date Performed: 2020    1) Patient's Home O2 Sat on room air, while at rest: 97%        If O2 sats on room air at rest are 88% or below, patient qualifies. No additional testing needed. Document N/A in steps 2 and 3. If 89% or above, complete steps 2.      2) Patient's O2 Sat on room air while exercisin%        If O2 sats on room air while exercising remain 89% or above patient does not qualify, no further testing needed Document N/A in step 3. If O2 sats on room air while exercising are 88% or below, continue to step 3.      3) Patient's O2 Sat while exercising on O2: n/a         (Must show improvement from #2 for patients to qualify)    If O2 sats improve on oxygen, patient qualifies for portable oxygen. If not, the patient does not qualify.

## 2020-09-17 NOTE — PLAN OF CARE
Anticipate home with NO NEEDS - lives with children.  Walk test completed today - ambulatory saturation on RA 91% no oxygen required for recovery. Will continue to follow    nAa Cleaning RN  Case Mangagement  Ext [phone]       09/17/20 1256   Post-Acute Status   Post-Acute Authorization Other   Post-Acute Placement Status Awaiting Internal Medical Clearance   Discharge Delays None known at this time   Discharge Plan   Discharge Plan A Home   Discharge Plan B Home with family

## 2020-09-18 VITALS
RESPIRATION RATE: 15 BRPM | HEART RATE: 83 BPM | WEIGHT: 293 LBS | BODY MASS INDEX: 45.99 KG/M2 | OXYGEN SATURATION: 98 % | DIASTOLIC BLOOD PRESSURE: 65 MMHG | TEMPERATURE: 98 F | SYSTOLIC BLOOD PRESSURE: 130 MMHG | HEIGHT: 67 IN

## 2020-09-18 LAB
ALBUMIN SERPL BCP-MCNC: 2.9 G/DL (ref 3.5–5.2)
ALP SERPL-CCNC: 62 U/L (ref 55–135)
ALT SERPL W/O P-5'-P-CCNC: 23 U/L (ref 10–44)
ANION GAP SERPL CALC-SCNC: 9 MMOL/L (ref 8–16)
AST SERPL-CCNC: 23 U/L (ref 10–40)
BASOPHILS # BLD AUTO: 0.02 K/UL (ref 0–0.2)
BASOPHILS NFR BLD: 0.3 % (ref 0–1.9)
BILIRUB SERPL-MCNC: 0.2 MG/DL (ref 0.1–1)
BUN SERPL-MCNC: 16 MG/DL (ref 6–20)
CALCIUM SERPL-MCNC: 8 MG/DL (ref 8.7–10.5)
CHLORIDE SERPL-SCNC: 105 MMOL/L (ref 95–110)
CO2 SERPL-SCNC: 25 MMOL/L (ref 23–29)
CREAT SERPL-MCNC: 0.7 MG/DL (ref 0.5–1.4)
DIFFERENTIAL METHOD: ABNORMAL
EOSINOPHIL # BLD AUTO: 0.1 K/UL (ref 0–0.5)
EOSINOPHIL NFR BLD: 0.9 % (ref 0–8)
ERYTHROCYTE [DISTWIDTH] IN BLOOD BY AUTOMATED COUNT: 16 % (ref 11.5–14.5)
EST. GFR  (AFRICAN AMERICAN): >60 ML/MIN/1.73 M^2
EST. GFR  (NON AFRICAN AMERICAN): >60 ML/MIN/1.73 M^2
GLUCOSE SERPL-MCNC: 84 MG/DL (ref 70–110)
HCT VFR BLD AUTO: 38.5 % (ref 37–48.5)
HGB BLD-MCNC: 11.6 G/DL (ref 12–16)
IMM GRANULOCYTES # BLD AUTO: 0.03 K/UL (ref 0–0.04)
IMM GRANULOCYTES NFR BLD AUTO: 0.4 % (ref 0–0.5)
LYMPHOCYTES # BLD AUTO: 2.8 K/UL (ref 1–4.8)
LYMPHOCYTES NFR BLD: 41 % (ref 18–48)
MAGNESIUM SERPL-MCNC: 1.8 MG/DL (ref 1.6–2.6)
MCH RBC QN AUTO: 24.5 PG (ref 27–31)
MCHC RBC AUTO-ENTMCNC: 30.1 G/DL (ref 32–36)
MCV RBC AUTO: 81 FL (ref 82–98)
MONOCYTES # BLD AUTO: 0.6 K/UL (ref 0.3–1)
MONOCYTES NFR BLD: 8.3 % (ref 4–15)
NEUTROPHILS # BLD AUTO: 3.3 K/UL (ref 1.8–7.7)
NEUTROPHILS NFR BLD: 49.1 % (ref 38–73)
NRBC BLD-RTO: 0 /100 WBC
PHOSPHATE SERPL-MCNC: 3.8 MG/DL (ref 2.7–4.5)
PLATELET # BLD AUTO: 208 K/UL (ref 150–350)
PMV BLD AUTO: 11 FL (ref 9.2–12.9)
POTASSIUM SERPL-SCNC: 3.9 MMOL/L (ref 3.5–5.1)
PROT SERPL-MCNC: 6.2 G/DL (ref 6–8.4)
RBC # BLD AUTO: 4.74 M/UL (ref 4–5.4)
SODIUM SERPL-SCNC: 139 MMOL/L (ref 136–145)
WBC # BLD AUTO: 6.73 K/UL (ref 3.9–12.7)

## 2020-09-18 PROCEDURE — 99239 PR HOSPITAL DISCHARGE DAY,>30 MIN: ICD-10-PCS | Mod: ,,, | Performed by: HOSPITALIST

## 2020-09-18 PROCEDURE — 25000003 PHARM REV CODE 250: Performed by: FAMILY MEDICINE

## 2020-09-18 PROCEDURE — 36415 COLL VENOUS BLD VENIPUNCTURE: CPT

## 2020-09-18 PROCEDURE — 84100 ASSAY OF PHOSPHORUS: CPT

## 2020-09-18 PROCEDURE — 99239 HOSP IP/OBS DSCHRG MGMT >30: CPT | Mod: ,,, | Performed by: HOSPITALIST

## 2020-09-18 PROCEDURE — 63600175 PHARM REV CODE 636 W HCPCS: Performed by: FAMILY MEDICINE

## 2020-09-18 PROCEDURE — 94640 AIRWAY INHALATION TREATMENT: CPT

## 2020-09-18 PROCEDURE — 99900035 HC TECH TIME PER 15 MIN (STAT)

## 2020-09-18 PROCEDURE — 83735 ASSAY OF MAGNESIUM: CPT

## 2020-09-18 PROCEDURE — 80053 COMPREHEN METABOLIC PANEL: CPT

## 2020-09-18 PROCEDURE — 94761 N-INVAS EAR/PLS OXIMETRY MLT: CPT

## 2020-09-18 PROCEDURE — 85025 COMPLETE CBC W/AUTO DIFF WBC: CPT

## 2020-09-18 RX ORDER — PROMETHAZINE HYDROCHLORIDE AND CODEINE PHOSPHATE 6.25; 1 MG/5ML; MG/5ML
5 SOLUTION ORAL EVERY 8 HOURS PRN
Qty: 118 ML | Refills: 0 | Status: SHIPPED | OUTPATIENT
Start: 2020-09-18 | End: 2020-09-28

## 2020-09-18 RX ORDER — ASCORBIC ACID 500 MG
500 TABLET ORAL 2 TIMES DAILY
COMMUNITY
Start: 2020-09-18

## 2020-09-18 RX ORDER — BENZONATATE 100 MG/1
100 CAPSULE ORAL 3 TIMES DAILY PRN
Qty: 30 CAPSULE | Refills: 0 | Status: SHIPPED | OUTPATIENT
Start: 2020-09-18 | End: 2020-09-28

## 2020-09-18 RX ORDER — ALBUTEROL SULFATE 90 UG/1
2 AEROSOL, METERED RESPIRATORY (INHALATION) EVERY 6 HOURS PRN
Qty: 18 G | Refills: 0 | Status: SHIPPED | OUTPATIENT
Start: 2020-09-18 | End: 2020-10-10

## 2020-09-18 RX ORDER — GUAIFENESIN/DEXTROMETHORPHAN 100-10MG/5
10 SYRUP ORAL EVERY 4 HOURS PRN
Refills: 0 | COMMUNITY
Start: 2020-09-18 | End: 2020-09-28

## 2020-09-18 RX ADMIN — IPRATROPIUM BROMIDE 2 PUFF: 17 AEROSOL, METERED RESPIRATORY (INHALATION) at 07:09

## 2020-09-18 RX ADMIN — Medication 1000 UNITS: at 09:09

## 2020-09-18 RX ADMIN — OXYCODONE HYDROCHLORIDE AND ACETAMINOPHEN 500 MG: 500 TABLET ORAL at 09:09

## 2020-09-18 RX ADMIN — IPRATROPIUM BROMIDE 2 PUFF: 17 AEROSOL, METERED RESPIRATORY (INHALATION) at 12:09

## 2020-09-18 RX ADMIN — ENOXAPARIN SODIUM 60 MG: 60 INJECTION SUBCUTANEOUS at 09:09

## 2020-09-18 RX ADMIN — ALBUTEROL SULFATE 2 PUFF: 90 AEROSOL, METERED RESPIRATORY (INHALATION) at 07:09

## 2020-09-18 RX ADMIN — ACETAMINOPHEN 650 MG: 325 TABLET ORAL at 01:09

## 2020-09-18 RX ADMIN — THERA TABS 1 TABLET: TAB at 09:09

## 2020-09-18 RX ADMIN — ALBUTEROL SULFATE 2 PUFF: 90 AEROSOL, METERED RESPIRATORY (INHALATION) at 01:09

## 2020-09-18 NOTE — PLAN OF CARE
09/18/20 1549   Final Note   Assessment Type Final Discharge Note   Anticipated Discharge Disposition Home   Right Care Referral Info   Post Acute Recommendation No Care   Post-Acute Status   Post-Acute Authorization Other   Other Status No Post-Acute Service Needs

## 2020-09-18 NOTE — PLAN OF CARE
Patient easily awaken. Alert and oriented x 4 on room air. No cardiac symptoms. No bm. Ambulates to the bathroom. Plans on going home today. Needs cage management to set her up with a ride. Call bell within reach. Side rails up x3 . Will continue to monitor

## 2020-09-18 NOTE — PLAN OF CARE
09/18/20 1350   Post-Acute Status   Post-Acute Authorization Other   Other Status See Comments     SW contacted [employer] and set up patient transport [phone] and spoke to Evelyn in transport department. Patient transportation has been scheduled and she reports that it could take 1 to 3 hours for transport to arrive but will strive to get a ride to patient soon. Reference number for ride is [id number].        Donna Grissom LMSW  [facility]   [id number]

## 2020-09-18 NOTE — NURSING
Written and oral DC instructions given to patient, verbalized understanding. Oximeter delivered to patient. Prescriptions sent in to patient's pharmacy of choice. IV sites, telemetry DC'd. No acute distress noted or voiced.

## 2020-09-18 NOTE — DISCHARGE SUMMARY
Discharge Summary  Hospital Medicine       Name: Mary Castellano  YOB: 1980 (Age: 40 y.o.)  Date of Admission: 9/16/2020  Date of Discharge: 9/18/2020  Attending Provider on Discharge: Suzy Mtz MD  [facility] Medicine Team: [facility]  Code status: Full Code    Primary Care Provider: No primary care provider on file.    Discharge Diagnosis:  Active Hospital Problems    Diagnosis  POA    *Morbid obesity with BMI of 70 and over, adult [E66.01, Z68.45]  Not Applicable    COVID-19 virus infection [U07.1]  Yes    SHEREE (obstructive sleep apnea) [G47.33]  Yes      Resolved Hospital Problems   No resolved problems to display.       HPI: 40yoF PMHx HTN, Morbid obesity, SHEREE (not on home cpap), fibromyalgia, who presented to  ED with Cough/SOB/URI Sx x 1 week, worsening x several days.  found to be COVID positive.  COVID risk factors: works at [employer], multiple teenagers in home. Tmax 101.2 at OSH 9/16/20. Cont to monitor. Transfer to [facility] per protocol. Doing relatively well upon admission to floor here. Sob with activity such as going to bathroom. Otherwise, stable on room air laying in bed. C/o intermittent cough. Inflammatory markers: CRP 56, .  Lactate 0.9. trop neg.  CXR clear, EKG ok. THAKKAR, RR to 30s with ambulation, sat 90-92. Dex IV 6mg given in ED    Hospital Course:  Mary Castellano was admitted to Hospital Medicine for treatment of suspected COVID-19 viral infection and was treated with supportive care following a comprehensive physical, radiographic, and lab evaluation tailored to the current standard of care for COVID19. Please review the admission H&P and the studies listed below for details. Pt was treated with breathing treatments, IS/FV during this admission. She was maintaining SPO2 > 95% on room air. She also passed her 6 min walk test and did not require O2 with exertion. She improved with supportive care and was found to be suitable  for discharge home without oxygen.     On the day of discharge, isolation precautions were reviewed at length verbally. The patient was also provided with written isolation guidelines modified from the [place] Department of Health and Lists of hospitals in the [place] as well as the CDC, as part of discharge paperwork. An updated phone number was obtained, which will be used to contact the patient when results are available, and positive patients will be enrolled in both the COVID-19 Home Symptom Monitoring program.    Labs:  Recent Labs   Lab 09/16/20  0506 09/17/20  0651 09/18/20  0414   WBC 7.00 6.93 6.73   HGB 11.7* 12.1 11.6*   HCT 37.9 39.4 38.5    222 208     Recent Labs   Lab 09/16/20  0506 09/17/20  0651 09/18/20  0414    142 139   K 4.3 3.6 3.9    106 105   CO2 26 26 25   BUN 14 15 16   CREATININE 0.8 0.8 0.7   * 119* 84   CALCIUM 8.4* 8.8 8.0*   MG  --  2.2 1.8   PHOS  --  3.8 3.8     Recent Labs   Lab 09/16/20  0506 09/17/20  0651 09/18/20  0414   ALKPHOS 77 72 62   ALT 26 27 23   AST 24 26 23   ALBUMIN 3.2* 3.3* 2.9*   PROT 6.9 6.9 6.2   BILITOT 0.2 0.2 0.2      No results for input(s): POCTGLUCOSE in the last 168 hours.  Recent Labs     09/16/20  0506   CPK 68   TROPONINI <0.006       ROS (Positive in Bold, otherwise negative)  Constitutional: fever, chills, night sweats  CV: chest pain, edema, palpitations  Resp: SOB, cough, sputum production  GI: changes in appetite, NVDC, pain, melena, hematochezia, GERD, hematemesis  : Dysuria, hematuria, urinary urgency, frequency  MSK: arthralgia/myalgia, joint swelling  Neuro/Psych: anxiety, depression    Discharge Exam: Patient was seen and examined on the date of discharge and determined to be suitable for discharge.    Procedures: CXR    Consultants: none    Current Discharge Medication List      START taking these medications    Details   ascorbic acid, vitamin C, (VITAMIN C) 500 MG tablet Take 1 tablet (500 mg total) by mouth 2 (two) times  daily.  Qty:        benzonatate (TESSALON) 100 MG capsule Take 1 capsule (100 mg total) by mouth 3 (three) times daily as needed for Cough.  Qty: 30 capsule, Refills: 0      dextromethorphan-guaifenesin  mg/5 ml (ROBITUSSIN-DM)  mg/5 mL liquid Take 10 mLs by mouth every 4 (four) hours as needed.  Qty:  , Refills: 0      multivitamin Tab Take 1 tablet by mouth once daily.  Qty: 30 tablet, Refills: 11      promethazine-codeine 6.25-10 mg/5 ml (PHENERGAN WITH CODEINE) 6.25-10 mg/5 mL syrup Take 5 mLs by mouth every 8 (eight) hours as needed for Cough.  Qty: 118 mL, Refills: 0    Comments: Quantity prescribed more than 7 day supply? No      pulse oximeter (PULSE OXIMETER) device by Apply Externally route 2 (two) times a day. Use twice daily at 8 AM and 3 PM and record the value in [employer] as directed.  Qty: 1 each, Refills: 0    Comments: This is a NO CHARGE item.  Please override price to zero.  DO NOT PRINT.  NORMAL MODE e-PRESCRIBE ONLY.         CONTINUE these medications which have CHANGED    Details   albuterol (PROVENTIL/VENTOLIN HFA) 90 mcg/actuation inhaler Inhale 2 puffs into the lungs every 6 (six) hours as needed for Wheezing. Rescue  Qty: 18 g, Refills: 0         CONTINUE these medications which have NOT CHANGED    Details   acetaminophen (TYLENOL) 650 MG TbSR Take 1 tablet (650 mg total) by mouth every 8 (eight) hours.  Qty: 20 tablet, Refills: 0      azelastine (ASTELIN) 137 mcg (0.1 %) nasal spray 1 spray (137 mcg total) by Nasal route 2 (two) times daily.  Qty: 30 mL, Refills: 0      !! buPROPion (WELLBUTRIN XL) 150 MG TB24 tablet Take 150 mg by mouth once daily.      !! buPROPion (WELLBUTRIN XL) 300 MG 24 hr tablet Take 300 mg by mouth once daily.      cetirizine (ZYRTEC) 10 MG tablet Take 1 tablet (10 mg total) by mouth once daily.  Qty: 30 tablet, Refills: 0      diphenhydrAMINE (BENADRYL) 25 mg capsule Take 1 capsule (25 mg total) by mouth every 6 (six) hours as needed for Insomnia (Take  if headache does not resolve).  Qty: 20 capsule, Refills: 0      gabapentin (NEURONTIN) 300 MG capsule Take 1 capsule (300 mg total) by mouth 3 (three) times daily.  Qty: 90 capsule, Refills: 3    Associated Diagnoses: Myalgia; Arthralgia, unspecified joint      hydroCHLOROthiazide (HYDRODIURIL) 25 MG tablet Take 25 mg by mouth once daily.      lidocaine HCl 2% (XYLOCAINE) 2 % JelP urojet by Mucous Membrane route 2 (two) times daily. Apply to affected area  Qty: 1 Tube, Refills: 0      lisinopril 10 MG tablet Take 10 mg by mouth once daily.      metformin (GLUCOPHAGE) 250 MG tablet Take 500 mg by mouth 2 (two) times daily with meals.      metoprolol tartrate (LOPRESSOR) 25 MG tablet Take 25 mg by mouth 2 (two) times daily.      omeprazole (PRILOSEC) 40 MG capsule Take 40 mg by mouth once daily.      ondansetron (ZOFRAN) 4 MG tablet Take 1 tablet (4 mg total) by mouth every 8 (eight) hours as needed for Nausea.  Qty: 20 tablet, Refills: 0      promethazine (PHENERGAN) 25 MG tablet Take 1 tablet (25 mg total) by mouth every 6 (six) hours as needed for Nausea (Take if headache does not resolve.).  Qty: 15 tablet, Refills: 0      traMADol (ULTRAM) 50 mg tablet Take 1 tablet (50 mg total) by mouth every 6 (six) hours as needed for Pain.  Qty: 12 tablet, Refills: 0      valACYclovir (VALTREX) 1000 MG tablet Take 1 tablet (1,000 mg total) by mouth 3 (three) times daily.  Qty: 30 tablet, Refills: 0       !! - Potential duplicate medications found. Please discuss with provider.      STOP taking these medications       ibuprofen (ADVIL,MOTRIN) 600 MG tablet Comments:   Reason for Stopping:               The relevant and important risks, side effects, and benefits of their medications were reviewed with patient during hospitalization and at discharge. The patient was given the opportunity to discuss and ask questions about their medications, including target symptoms, potential risks, side effects and benefits of their  medications, as well as their expected prognosis if non-medication treatment options were chosen.  The patient expresses understanding of all these options and information and voluntarily consents to treatment.    Discharge Diet:cardiac diet and diabetic diet: 2000 calorie with Normal Fluid intake of 1500 - 2000 mL per day    Activity: activity as tolerated    Discharge Condition: Stable    Disposition: Home or Self Care     Time spent  on the discharge of the patient including review of hospital course with the patient. reviewing discharge medications and arranging follow-up care: 39 mins    Discharge examination Patient was seen and examined on the date of discharge and determined to be suitable for discharge.    Discharge plan and follow up:  It is critical that you make your follow-up appointment(s). If you are discharged on the weekend or after business hours, or if we are unable to schedule these appointments for you for any reason, you or a family member need to call during the next business day to schedule your appointment(s).    -Follow up with your PCP, No primary care provider on file. within 1-2 weeks as arranged with SW and treatment team prior to discharge  -Follow up with COVID 19 home monitoring program   -Take all medications as prescribed and listed above.     - Please return to ED or call your physician if you have:         1. Fevers > 101.5 unresponsive to tylenol.       2. Abdominal pain and/or distention       3. Intractable nausea, vomiting or diarrhea       4. Inability to tolerate adequate oral intake of food       5. Neurologic changes, chest pain or shortness of breath     6. Worsening shortness of breath        F/u with PCP    Suzy Mtz MD  [facility] Medicine Staff  [phone] pager

## 2020-09-19 ENCOUNTER — NURSE TRIAGE (OUTPATIENT)
Dept: ADMINISTRATIVE | Facility: CLINIC | Age: 40
End: 2020-09-19

## 2020-09-19 NOTE — TELEPHONE ENCOUNTER
First Contact      Contacted patient on behalf of [facility]'s Covid Surveillance Enrollment and Orientation process. Verified idenitity by stating first and last  Name and .  Program explained to patient.  Patient chose to opt out of Surveillance but stated yes to [id number].   Patient stated she was feeling better since seen in the ED yesterday.  Patient has pulse ox and she last checked her  O2 sat last night and it ws 92% on room air.  Patient denied fever Patient with slight SOB at rest and on exertion.  Denied CP. Patient has OOC number if non-emergent needs arise and access to 911 for emergencies.  Sp02:92    Pulse: 72    Temperature:afebrile    SOB at rest (0-5): 1    SOB with activity (0-5): 1    Cough: no    Is cough worsening?: no    Fever symptoms: no    Increased home oxygen?:no

## 2020-09-19 NOTE — TELEPHONE ENCOUNTER
Reason for Disposition   [1] COVID-19 infection diagnosed or suspected AND [2] mild symptoms (fever, cough) AND [3] no trouble breathing or other complications   COVID-19 Home Isolation, questions about   COVID-19 Prevention and Healthy Living, questions about    Additional Information   Negative: Severe difficulty breathing (e.g., struggling for each breath, speaks in single words)   Negative: Difficult to awaken or acting confused (e.g., disoriented, slurred speech)   Negative: Bluish (or gray) lips or face now   Negative: Shock suspected (e.g., cold/pale/clammy skin, too weak to stand, low BP, rapid pulse)   Negative: Sounds like a life-threatening emergency to the triager   Negative: [1] COVID-19 suspected (e.g., cough, fever, shortness of breath) AND [2] mild symptoms AND [3] public health department recommends testing   Negative: [1] COVID-19 exposure AND [2] no symptoms   Negative: COVID-19 and Breastfeeding, questions about   Negative: SEVERE or constant chest pain (Exception: mild central chest pain, present only when coughing)   Negative: MODERATE difficulty breathing (e.g., speaks in phrases, SOB even at rest, pulse 100-120)   Negative: Patient sounds very sick or weak to the triager   Negative: MILD difficulty breathing (e.g., minimal/no SOB at rest, SOB with walking, pulse <100)   Negative: Chest pain   Negative: Fever > 103 F (39.4 C)   Negative: [1] Fever > 101 F (38.3 C) AND [2] age > 60   Negative: [1] Fever > 100.0 F (37.8 C) AND [2] bedridden (e.g., nursing home patient, CVA, chronic illness, recovering from surgery)   Negative: HIGH RISK patient (e.g., age > 64 years, diabetes, heart or lung disease, weak immune system)   Negative: Fever present > 3 days (72 hours)   Negative: [1] Fever returns after gone for over 24 hours AND [2] symptoms worse or not improved   Negative: [1] Continuous (nonstop) coughing interferes with work or school AND [2] no improvement using cough  treatment per protocol   Negative: Cough present > 3 weeks    Protocols used: CORONAVIRUS (COVID-19) - DIAGNOSED OR [id number]

## 2020-09-21 LAB
BACTERIA BLD CULT: NORMAL
BACTERIA BLD CULT: NORMAL

## 2020-09-22 NOTE — PHYSICIAN QUERY
PT Name: Mary Castellano  MR #: [id number]     Documentation Clarification      CDS/: Diya Bravo RN              Contact information: [email or web address]    This form is a permanent document in the medical record.     Query Date: September 22, 2020    By submitting this query, we are merely seeking further clarification of documentation. Please utilize your independent clinical judgment when addressing the question(s) below.    The Medical Record reflects the following:    Clinical Findings Location in Medical Record   Patient admitted for evaluation of acute respiratory failure with hypoxia secondary to COVID-19      No respiratory distress observed    Acute Hypoxemic Respiratory Failure       She was maintaining SPO2 > 95% on room air. She also passed her 6 min walk test and did not require O2 with exertion.      SpO2:  [93 %-97 %]      Underwent a 6 min walk test today were oxygen dropped down to 91% on room air. She was noted to be slightly tachycardic with ambulation.       Room Air    Resp:  [17-34]     Patient is mildly tachypneic       HM PN of 9/17          ED Provider Note of 9/16    H & P of 9/16      Discharge Summary of 9/18          H & P of 9/16       PN of 9/17            Vital Flow Sheet of 9/16 to 9/18    H & P of 9/16    ED Provider Note of 9/16      Acute Respiratory Failure with Hypoxia - ABG pO2 < 60 mmHg or O2 sat of <91% on room air and respiratory symptoms documented                                                                         Provider, please clarify the diagnosis of : Acute Hypoxemic Respiratory Failure      [   ] Acute Hypoxemic Respiratory Failure is confirmed and additional clinical support/decision-making indicators for the diagnosis include (please specify):________________   [x   ] Above stated Acute Hypoxemic Respiratory Failure is not confirmed and/or it has been ruled out   [   ] Above stated Acute Hypoxemic Respiratory Failure is not  confirmed and/or it has been ruled out, other diagnosis ruled in (please specify):_______________   [   ] Other clarification (please specify): ___________________   [  ] Clinically undetermined

## 2020-09-22 NOTE — PHYSICIAN QUERY
PT Name: Mary Castellano  MR #: [id number]     Documentation Clarification      CDS/: Diya Bravo RN             Contact information: [email or web address]    This form is a permanent document in the medical record.     Query Date: September 22, 2020    By submitting this query, we are merely seeking further clarification of documentation. Please utilize your independent clinical judgment when addressing the question(s) below.    The Medical Record reflects the following:    Clinical Findings Location in Medical Record   Viral Pneumonia due to COVID-19     SpO2:  [93 %-97 %]    Resp:  [17-34]     Temp:  [98.9 °F (37.2 °C)-101.2 °F    No respiratory distress observed    Patient is mildly tachypneic    Respiratory: Positive for cough, shortness of breath and wheezing    She was maintaining SPO2 > 95% on room air. She also passed her 6 min walk test and did not require O2 with exertion    Underwent a 6 min walk test today were oxygen dropped down to 91% on room air. She was noted to be slightly tachycardic with ambulation.        Room Air    Albuterol inhaler 2 puffs every 6 hours     Ipratropium inhaler 2 puff every 6 hours     FINDINGS:  Single portable chest view is submitted.  There is mild diminished depth of inspiration, and mild rotation, when accounting for these factors the cardiomediastinal silhouette appears appropriate, and there is no evidence for confluent infiltrate or consolidation, significant pleural effusion or pneumothorax.  Impression:  Diminished depth of inspiration without additional radiographic evidence for superimposed acute intrathoracic process     9/16/2020 05:06 9/17/2020 06:51 9/18/2020 04:14   WBC 7.00 6.93 6.73      H & P of 9/16    H & P of 9/16    H & P of 9/16    H & P of 9/16    ED Provider Note of 9/16    ED Provider Note of 9/16    ED Provider Note of 9/16    Discharge Summary of 9/18       PN of 9/17            Vital Flow Sheet of 9/16 to 9/18    MAR  9/16 to 9/18    MAR  9/16 to 9/18    Chest Xray of 9/16                    Lab 9/16 to 9/18                                                                          Provider, please clarify the diagnosis of  Pneumonia :      [   ] Pneumonia is confirmed and additional clinical support/decision-making indicators for the diagnosis include (please specify):_______________   [ x  ] Above stated  Pneumonia is not confirmed and/or it has been ruled out   [   ] Above stated Pneumonia is not confirmed and/or it has been ruled out, other diagnosis ruled in (please specify):_______________   [   ] Other clarification (please specify): ___________________   [  ] Clinically undetermined

## 2020-10-02 ENCOUNTER — NURSE TRIAGE (OUTPATIENT)
Dept: ADMINISTRATIVE | Facility: CLINIC | Age: 40
End: 2020-10-02

## 2020-10-02 NOTE — TELEPHONE ENCOUNTER
Pt called in response to text. Pt said that she was dx with COVID 18 days ago she finds she is still SOB, Night sweats, and coughing has gotten considerably worse. Pt said she was hospitalized and discharged on 9'18. Pt asked if taking cough medicine and she has tenisilon pearls but not helpful, She is taking her temp and not running fever. Told to go to the ED per protocol and she said that she has an MD appt on Mon. Encouraged to go to ED. But unsure if will follow recommendations         Reason for Disposition   MILD difficulty breathing (e.g., minimal/no SOB at rest, SOB with walking, pulse <100)    Additional Information   Negative: Severe difficulty breathing (e.g., struggling for each breath, speaks in single words)   Negative: Difficult to awaken or acting confused (e.g., disoriented, slurred speech)   Negative: Bluish (or gray) lips or face now   Negative: Shock suspected (e.g., cold/pale/clammy skin, too weak to stand, low BP, rapid pulse)   Negative: Sounds like a life-threatening emergency to the triager   Negative: [1] COVID-19 exposure AND [2] no symptoms   Negative: COVID-19 and Breastfeeding, questions about   Negative: [1] Adult with possible COVID-19 symptoms AND [2] triager concerned about severity of symptoms or other causes   Negative: SEVERE or constant chest pain or pressure (Exception: mild central chest pain, present only when coughing)   Negative: MODERATE difficulty breathing (e.g., speaks in phrases, SOB even at rest, pulse 100-120)    Protocols used: CORONAVIRUS (COVID-19) DIAGNOSED OR [id number]

## 2020-10-10 RX ORDER — ALBUTEROL SULFATE 90 UG/1
AEROSOL, METERED RESPIRATORY (INHALATION)
Qty: 18 G | Refills: 0 | OUTPATIENT
Start: 2020-10-10 | End: 2021-01-14

## 2021-01-14 ENCOUNTER — HOSPITAL ENCOUNTER (EMERGENCY)
Facility: HOSPITAL | Age: 41
Discharge: HOME | End: 2021-01-14
Attending: EMERGENCY MEDICINE
Payer: COMMERCIAL

## 2021-01-14 VITALS
DIASTOLIC BLOOD PRESSURE: 85 MMHG | HEART RATE: 96 BPM | TEMPERATURE: 98 F | SYSTOLIC BLOOD PRESSURE: 140 MMHG | BODY MASS INDEX: 45.99 KG/M2 | RESPIRATION RATE: 20 BRPM | OXYGEN SATURATION: 98 % | HEIGHT: 67 IN | WEIGHT: 293 LBS

## 2021-01-14 DIAGNOSIS — M54.31 RIGHT SIDED SCIATICA: Primary | ICD-10-CM

## 2021-01-14 LAB
B-HCG UR QL: NEGATIVE
CTP QC/QA: YES

## 2021-01-14 PROCEDURE — 96372 THER/PROPH/DIAG INJ SC/IM: CPT | Mod: ER

## 2021-01-14 PROCEDURE — 63600175 PHARM REV CODE 636 W HCPCS: Mod: ER | Performed by: NURSE PRACTITIONER

## 2021-01-14 PROCEDURE — 81025 URINE PREGNANCY TEST: CPT | Mod: ER | Performed by: EMERGENCY MEDICINE

## 2021-01-14 PROCEDURE — 99284 EMERGENCY DEPT VISIT MOD MDM: CPT | Mod: 25,ER

## 2021-01-14 RX ORDER — KETOROLAC TROMETHAMINE 30 MG/ML
30 INJECTION, SOLUTION INTRAMUSCULAR; INTRAVENOUS
Status: COMPLETED | OUTPATIENT
Start: 2021-01-14 | End: 2021-01-14

## 2021-01-14 RX ORDER — LIDOCAINE 50 MG/G
1 PATCH TOPICAL DAILY
Qty: 15 PATCH | Refills: 0 | Status: SHIPPED | OUTPATIENT
Start: 2021-01-14

## 2021-01-14 RX ORDER — METHOCARBAMOL 500 MG/1
1000 TABLET, FILM COATED ORAL 3 TIMES DAILY
Qty: 30 TABLET | Refills: 0 | Status: SHIPPED | OUTPATIENT
Start: 2021-01-14 | End: 2021-01-19

## 2021-01-14 RX ORDER — DICLOFENAC SODIUM 25 MG/1
25 TABLET, DELAYED RELEASE ORAL 3 TIMES DAILY
Qty: 15 TABLET | Refills: 0 | Status: SHIPPED | OUTPATIENT
Start: 2021-01-14 | End: 2021-01-19

## 2021-01-14 RX ADMIN — KETOROLAC TROMETHAMINE 30 MG: 30 INJECTION, SOLUTION INTRAMUSCULAR at 01:01

## 2021-02-28 ENCOUNTER — HOSPITAL ENCOUNTER (EMERGENCY)
Facility: HOSPITAL | Age: 41
Discharge: HOME | End: 2021-03-01
Attending: EMERGENCY MEDICINE
Payer: COMMERCIAL

## 2021-02-28 DIAGNOSIS — S39.012A STRAIN OF LUMBAR REGION, INITIAL ENCOUNTER: Primary | ICD-10-CM

## 2021-02-28 LAB
B-HCG UR QL: NEGATIVE
BILIRUB UR QL STRIP: NEGATIVE
CLARITY UR: ABNORMAL
COLOR UR: YELLOW
CTP QC/QA: YES
GLUCOSE UR QL STRIP: NEGATIVE
HGB UR QL STRIP: ABNORMAL
KETONES UR QL STRIP: NEGATIVE
LEUKOCYTE ESTERASE UR QL STRIP: NEGATIVE
NITRITE UR QL STRIP: NEGATIVE
PH UR STRIP: 6 [PH] (ref 5–8)
PROT UR QL STRIP: ABNORMAL
SP GR UR STRIP: 1.02 (ref 1–1.03)
URN SPEC COLLECT METH UR: ABNORMAL
UROBILINOGEN UR STRIP-ACNC: NEGATIVE EU/DL

## 2021-02-28 PROCEDURE — 81003 URINALYSIS AUTO W/O SCOPE: CPT

## 2021-02-28 PROCEDURE — 96372 THER/PROPH/DIAG INJ SC/IM: CPT

## 2021-02-28 PROCEDURE — 81025 URINE PREGNANCY TEST: CPT | Performed by: PHYSICIAN ASSISTANT

## 2021-02-28 PROCEDURE — 25000003 PHARM REV CODE 250: Performed by: EMERGENCY MEDICINE

## 2021-02-28 PROCEDURE — 99284 EMERGENCY DEPT VISIT MOD MDM: CPT | Mod: 25

## 2021-02-28 RX ORDER — CYCLOBENZAPRINE HCL 10 MG
10 TABLET ORAL
Status: COMPLETED | OUTPATIENT
Start: 2021-02-28 | End: 2021-02-28

## 2021-02-28 RX ORDER — HYDROCODONE BITARTRATE AND ACETAMINOPHEN 5; 325 MG/1; MG/1
1 TABLET ORAL
Status: COMPLETED | OUTPATIENT
Start: 2021-02-28 | End: 2021-02-28

## 2021-02-28 RX ORDER — ARIPIPRAZOLE 15 MG/1
5 TABLET ORAL DAILY
COMMUNITY

## 2021-02-28 RX ORDER — DIAZEPAM 5 MG/1
5 TABLET ORAL
Status: COMPLETED | OUTPATIENT
Start: 2021-03-01 | End: 2021-02-28

## 2021-02-28 RX ORDER — KETOROLAC TROMETHAMINE 30 MG/ML
30 INJECTION, SOLUTION INTRAMUSCULAR; INTRAVENOUS
Status: COMPLETED | OUTPATIENT
Start: 2021-03-01 | End: 2021-02-28

## 2021-02-28 RX ADMIN — DIAZEPAM 5 MG: 5 TABLET ORAL at 11:02

## 2021-02-28 RX ADMIN — CYCLOBENZAPRINE HYDROCHLORIDE 10 MG: 10 TABLET, FILM COATED ORAL at 09:02

## 2021-02-28 RX ADMIN — HYDROCODONE BITARTRATE AND ACETAMINOPHEN 1 TABLET: 5; 325 TABLET ORAL at 09:02

## 2021-02-28 RX ADMIN — KETOROLAC TROMETHAMINE 30 MG: 30 INJECTION, SOLUTION INTRAMUSCULAR at 11:02

## 2021-03-01 ENCOUNTER — PATIENT OUTREACH (OUTPATIENT)
Dept: EMERGENCY MEDICINE | Facility: HOSPITAL | Age: 41
End: 2021-03-01

## 2021-03-01 VITALS
DIASTOLIC BLOOD PRESSURE: 77 MMHG | WEIGHT: 293 LBS | TEMPERATURE: 99 F | RESPIRATION RATE: 16 BRPM | BODY MASS INDEX: 45.99 KG/M2 | HEIGHT: 67 IN | HEART RATE: 90 BPM | SYSTOLIC BLOOD PRESSURE: 115 MMHG | OXYGEN SATURATION: 100 %

## 2021-03-01 PROCEDURE — 63600175 PHARM REV CODE 636 W HCPCS: Performed by: EMERGENCY MEDICINE

## 2021-03-01 PROCEDURE — 25000003 PHARM REV CODE 250: Performed by: EMERGENCY MEDICINE

## 2021-03-01 RX ORDER — NAPROXEN 500 MG/1
500 TABLET ORAL 2 TIMES DAILY WITH MEALS
Qty: 14 TABLET | Refills: 0 | Status: SHIPPED | OUTPATIENT
Start: 2021-03-01 | End: 2021-03-08

## 2021-03-01 RX ORDER — DIAZEPAM 2 MG/1
2 TABLET ORAL EVERY 12 HOURS PRN
Qty: 10 TABLET | Refills: 0 | Status: SHIPPED | OUTPATIENT
Start: 2021-03-01 | End: 2021-03-06

## 2021-03-20 ENCOUNTER — HOSPITAL ENCOUNTER (EMERGENCY)
Facility: HOSPITAL | Age: 41
Discharge: HOME | End: 2021-03-20
Attending: EMERGENCY MEDICINE
Payer: COMMERCIAL

## 2021-03-20 VITALS
DIASTOLIC BLOOD PRESSURE: 84 MMHG | HEART RATE: 81 BPM | HEIGHT: 67 IN | WEIGHT: 293 LBS | SYSTOLIC BLOOD PRESSURE: 134 MMHG | BODY MASS INDEX: 45.99 KG/M2 | RESPIRATION RATE: 18 BRPM | OXYGEN SATURATION: 96 % | TEMPERATURE: 98 F

## 2021-03-20 DIAGNOSIS — Z86.69 HISTORY OF MIGRAINE: ICD-10-CM

## 2021-03-20 DIAGNOSIS — J06.9 VIRAL URI WITH COUGH: Primary | ICD-10-CM

## 2021-03-20 DIAGNOSIS — R09.81 SINUS CONGESTION: ICD-10-CM

## 2021-03-20 DIAGNOSIS — G43.909 MIGRAINE WITHOUT STATUS MIGRAINOSUS, NOT INTRACTABLE, UNSPECIFIED MIGRAINE TYPE: ICD-10-CM

## 2021-03-20 LAB
B-HCG UR QL: NEGATIVE
CTP QC/QA: YES

## 2021-03-20 PROCEDURE — 81025 URINE PREGNANCY TEST: CPT | Performed by: PHYSICIAN ASSISTANT

## 2021-03-20 PROCEDURE — 99284 EMERGENCY DEPT VISIT MOD MDM: CPT | Mod: 25

## 2021-03-20 RX ORDER — BENZONATATE 100 MG/1
100 CAPSULE ORAL 3 TIMES DAILY PRN
Qty: 20 CAPSULE | Refills: 0 | Status: SHIPPED | OUTPATIENT
Start: 2021-03-20 | End: 2021-03-30

## 2021-03-20 RX ORDER — LORATADINE 10 MG/1
10 TABLET ORAL DAILY
Qty: 7 TABLET | Refills: 0 | Status: SHIPPED | OUTPATIENT
Start: 2021-03-20 | End: 2021-03-27

## 2021-03-20 RX ORDER — PREDNISONE 20 MG/1
60 TABLET ORAL DAILY
Qty: 9 TABLET | Refills: 0 | Status: SHIPPED | OUTPATIENT
Start: 2021-03-20 | End: 2021-03-23

## 2021-03-20 RX ORDER — BUTALBITAL, ACETAMINOPHEN AND CAFFEINE 50; 325; 40 MG/1; MG/1; MG/1
1 TABLET ORAL EVERY 6 HOURS PRN
Qty: 12 TABLET | Refills: 0 | Status: SHIPPED | OUTPATIENT
Start: 2021-03-20 | End: 2021-04-19

## 2021-03-20 RX ORDER — OXYMETAZOLINE HCL 0.05 %
1 SPRAY, NON-AEROSOL (ML) NASAL 2 TIMES DAILY
Qty: 15 ML | Refills: 0 | Status: SHIPPED | OUTPATIENT
Start: 2021-03-20 | End: 2021-03-23

## 2021-04-15 ENCOUNTER — PATIENT MESSAGE (OUTPATIENT)
Dept: RESEARCH | Facility: HOSPITAL | Age: 41
End: 2021-04-15

## 2021-04-24 ENCOUNTER — HOSPITAL ENCOUNTER (EMERGENCY)
Facility: HOSPITAL | Age: 41
Discharge: HOME | End: 2021-04-24
Attending: EMERGENCY MEDICINE
Payer: COMMERCIAL

## 2021-04-24 VITALS
SYSTOLIC BLOOD PRESSURE: 131 MMHG | OXYGEN SATURATION: 100 % | RESPIRATION RATE: 18 BRPM | HEIGHT: 67 IN | DIASTOLIC BLOOD PRESSURE: 74 MMHG | WEIGHT: 293 LBS | HEART RATE: 78 BPM | BODY MASS INDEX: 45.99 KG/M2 | TEMPERATURE: 99 F

## 2021-04-24 DIAGNOSIS — M25.471 RIGHT ANKLE SWELLING: ICD-10-CM

## 2021-04-24 DIAGNOSIS — M25.571 RIGHT ANKLE PAIN: ICD-10-CM

## 2021-04-24 DIAGNOSIS — M79.661 PAIN AND SWELLING OF RIGHT LOWER LEG: Primary | ICD-10-CM

## 2021-04-24 DIAGNOSIS — M79.605 LEFT LEG PAIN: ICD-10-CM

## 2021-04-24 DIAGNOSIS — M79.89 PAIN AND SWELLING OF RIGHT LOWER LEG: Primary | ICD-10-CM

## 2021-04-24 LAB
B-HCG UR QL: NEGATIVE
CTP QC/QA: YES

## 2021-04-24 PROCEDURE — 25000003 PHARM REV CODE 250: Performed by: PHYSICIAN ASSISTANT

## 2021-04-24 PROCEDURE — 99284 EMERGENCY DEPT VISIT MOD MDM: CPT | Mod: 25

## 2021-04-24 PROCEDURE — 81025 URINE PREGNANCY TEST: CPT | Performed by: PHYSICIAN ASSISTANT

## 2021-04-24 RX ORDER — KETOROLAC TROMETHAMINE 10 MG/1
10 TABLET, FILM COATED ORAL
Status: COMPLETED | OUTPATIENT
Start: 2021-04-24 | End: 2021-04-24

## 2021-04-24 RX ORDER — KETOROLAC TROMETHAMINE 10 MG/1
10 TABLET, FILM COATED ORAL EVERY 6 HOURS
Qty: 20 TABLET | Refills: 0 | Status: SHIPPED | OUTPATIENT
Start: 2021-04-24 | End: 2021-04-29

## 2021-04-24 RX ADMIN — KETOROLAC TROMETHAMINE 10 MG: 10 TABLET, FILM COATED ORAL at 06:04

## 2021-05-04 ENCOUNTER — HOSPITAL ENCOUNTER (EMERGENCY)
Facility: HOSPITAL | Age: 41
Discharge: HOME | End: 2021-05-05
Attending: EMERGENCY MEDICINE
Payer: COMMERCIAL

## 2021-05-04 DIAGNOSIS — M54.41 CHRONIC RIGHT-SIDED LOW BACK PAIN WITH RIGHT-SIDED SCIATICA: Primary | ICD-10-CM

## 2021-05-04 DIAGNOSIS — G89.29 CHRONIC RIGHT-SIDED LOW BACK PAIN WITH RIGHT-SIDED SCIATICA: Primary | ICD-10-CM

## 2021-05-04 PROCEDURE — 99284 EMERGENCY DEPT VISIT MOD MDM: CPT | Mod: 25

## 2021-05-04 PROCEDURE — 81025 URINE PREGNANCY TEST: CPT | Performed by: PHYSICIAN ASSISTANT

## 2021-05-04 PROCEDURE — 96372 THER/PROPH/DIAG INJ SC/IM: CPT

## 2021-05-04 RX ORDER — DIAZEPAM 5 MG/1
10 TABLET ORAL
Status: COMPLETED | OUTPATIENT
Start: 2021-05-05 | End: 2021-05-05

## 2021-05-04 RX ORDER — KETOROLAC TROMETHAMINE 30 MG/ML
30 INJECTION, SOLUTION INTRAMUSCULAR; INTRAVENOUS
Status: COMPLETED | OUTPATIENT
Start: 2021-05-05 | End: 2021-05-05

## 2021-05-05 VITALS
OXYGEN SATURATION: 100 % | HEART RATE: 80 BPM | HEIGHT: 67 IN | BODY MASS INDEX: 45.99 KG/M2 | RESPIRATION RATE: 18 BRPM | DIASTOLIC BLOOD PRESSURE: 57 MMHG | TEMPERATURE: 99 F | SYSTOLIC BLOOD PRESSURE: 111 MMHG | WEIGHT: 293 LBS

## 2021-05-05 LAB
B-HCG UR QL: NEGATIVE
CTP QC/QA: YES

## 2021-05-05 PROCEDURE — 63600175 PHARM REV CODE 636 W HCPCS: Performed by: NURSE PRACTITIONER

## 2021-05-05 PROCEDURE — 25000003 PHARM REV CODE 250: Performed by: NURSE PRACTITIONER

## 2021-05-05 RX ORDER — METHOCARBAMOL 500 MG/1
1000 TABLET, FILM COATED ORAL 3 TIMES DAILY PRN
Qty: 20 TABLET | Refills: 0 | Status: SHIPPED | OUTPATIENT
Start: 2021-05-05 | End: 2021-05-10

## 2021-05-05 RX ADMIN — KETOROLAC TROMETHAMINE 30 MG: 30 INJECTION, SOLUTION INTRAMUSCULAR; INTRAVENOUS at 12:05

## 2021-05-05 RX ADMIN — DIAZEPAM 10 MG: 5 TABLET ORAL at 12:05

## 2021-09-16 ENCOUNTER — HOSPITAL ENCOUNTER (EMERGENCY)
Facility: HOSPITAL | Age: 41
Discharge: HOME | End: 2021-09-16
Attending: EMERGENCY MEDICINE
Payer: MEDICAID

## 2021-09-16 VITALS
BODY MASS INDEX: 67.66 KG/M2 | HEART RATE: 90 BPM | RESPIRATION RATE: 13 BRPM | WEIGHT: 293 LBS | TEMPERATURE: 98 F | DIASTOLIC BLOOD PRESSURE: 84 MMHG | SYSTOLIC BLOOD PRESSURE: 150 MMHG | OXYGEN SATURATION: 99 %

## 2021-09-16 DIAGNOSIS — M25.569 KNEE PAIN: Primary | ICD-10-CM

## 2021-09-16 LAB
B-HCG UR QL: NEGATIVE
CTP QC/QA: YES

## 2021-09-16 PROCEDURE — 81025 URINE PREGNANCY TEST: CPT | Performed by: PHYSICIAN ASSISTANT

## 2021-09-16 PROCEDURE — 99284 EMERGENCY DEPT VISIT MOD MDM: CPT

## 2021-09-16 RX ORDER — IBUPROFEN 800 MG/1
800 TABLET ORAL EVERY 8 HOURS PRN
Qty: 30 TABLET | Refills: 0 | Status: SHIPPED | OUTPATIENT
Start: 2021-09-16

## 2021-09-16 RX ORDER — PREDNISONE 50 MG/1
50 TABLET ORAL DAILY
Qty: 7 TABLET | Refills: 0 | Status: SHIPPED | OUTPATIENT
Start: 2021-09-16 | End: 2021-09-23

## 2021-09-16 RX ORDER — TRAMADOL HYDROCHLORIDE 50 MG/1
50 TABLET ORAL EVERY 4 HOURS PRN
Qty: 6 TABLET | Refills: 0 | Status: SHIPPED | OUTPATIENT
Start: 2021-09-16

## 2021-09-23 ENCOUNTER — HOSPITAL ENCOUNTER (EMERGENCY)
Facility: HOSPITAL | Age: 41
Discharge: HOME | End: 2021-09-23
Attending: EMERGENCY MEDICINE
Payer: MEDICAID

## 2021-09-23 VITALS
TEMPERATURE: 99 F | WEIGHT: 293 LBS | RESPIRATION RATE: 18 BRPM | DIASTOLIC BLOOD PRESSURE: 69 MMHG | BODY MASS INDEX: 45.99 KG/M2 | OXYGEN SATURATION: 99 % | HEART RATE: 79 BPM | HEIGHT: 67 IN | SYSTOLIC BLOOD PRESSURE: 131 MMHG

## 2021-09-23 DIAGNOSIS — R31.9 URINARY TRACT INFECTION WITH HEMATURIA, SITE UNSPECIFIED: ICD-10-CM

## 2021-09-23 DIAGNOSIS — R06.02 SHORTNESS OF BREATH: Primary | ICD-10-CM

## 2021-09-23 DIAGNOSIS — N39.0 URINARY TRACT INFECTION WITH HEMATURIA, SITE UNSPECIFIED: ICD-10-CM

## 2021-09-23 LAB
ALBUMIN SERPL BCP-MCNC: 3.1 G/DL (ref 3.5–5.2)
ALP SERPL-CCNC: 61 U/L (ref 55–135)
ALT SERPL W/O P-5'-P-CCNC: 37 U/L (ref 10–44)
ANION GAP SERPL CALC-SCNC: 8 MMOL/L (ref 8–16)
AST SERPL-CCNC: 19 U/L (ref 10–40)
B-HCG UR QL: NEGATIVE
BASOPHILS # BLD AUTO: 0.06 K/UL (ref 0–0.2)
BASOPHILS NFR BLD: 0.4 % (ref 0–1.9)
BILIRUB SERPL-MCNC: 0.3 MG/DL (ref 0.1–1)
BILIRUB UR QL STRIP: NEGATIVE
BNP SERPL-MCNC: 16 PG/ML (ref 0–99)
BUN SERPL-MCNC: 28 MG/DL (ref 6–20)
CALCIUM SERPL-MCNC: 8.7 MG/DL (ref 8.7–10.5)
CHLORIDE SERPL-SCNC: 106 MMOL/L (ref 95–110)
CLARITY UR: ABNORMAL
CO2 SERPL-SCNC: 26 MMOL/L (ref 23–29)
COLOR UR: YELLOW
CREAT SERPL-MCNC: 0.8 MG/DL (ref 0.5–1.4)
CTP QC/QA: YES
CTP QC/QA: YES
D DIMER PPP IA.FEU-MCNC: 0.64 MG/L FEU
DIFFERENTIAL METHOD: ABNORMAL
EOSINOPHIL # BLD AUTO: 0.3 K/UL (ref 0–0.5)
EOSINOPHIL NFR BLD: 1.8 % (ref 0–8)
ERYTHROCYTE [DISTWIDTH] IN BLOOD BY AUTOMATED COUNT: 14.9 % (ref 11.5–14.5)
EST. GFR  (AFRICAN AMERICAN): >60 ML/MIN/1.73 M^2
EST. GFR  (NON AFRICAN AMERICAN): >60 ML/MIN/1.73 M^2
GLUCOSE SERPL-MCNC: 92 MG/DL (ref 70–110)
GLUCOSE UR QL STRIP: NEGATIVE
HCT VFR BLD AUTO: 33.7 % (ref 37–48.5)
HGB BLD-MCNC: 10.5 G/DL (ref 12–16)
HGB UR QL STRIP: ABNORMAL
IMM GRANULOCYTES # BLD AUTO: 0.12 K/UL (ref 0–0.04)
IMM GRANULOCYTES NFR BLD AUTO: 0.9 % (ref 0–0.5)
KETONES UR QL STRIP: NEGATIVE
LACTATE SERPL-SCNC: 1.1 MMOL/L (ref 0.5–2.2)
LEUKOCYTE ESTERASE UR QL STRIP: ABNORMAL
LIPASE SERPL-CCNC: 23 U/L (ref 4–60)
LYMPHOCYTES # BLD AUTO: 4 K/UL (ref 1–4.8)
LYMPHOCYTES NFR BLD: 28.7 % (ref 18–48)
MCH RBC QN AUTO: 26.3 PG (ref 27–31)
MCHC RBC AUTO-ENTMCNC: 31.2 G/DL (ref 32–36)
MCV RBC AUTO: 85 FL (ref 82–98)
MICROSCOPIC COMMENT: ABNORMAL
MONOCYTES # BLD AUTO: 0.9 K/UL (ref 0.3–1)
MONOCYTES NFR BLD: 6.5 % (ref 4–15)
NEUTROPHILS # BLD AUTO: 8.7 K/UL (ref 1.8–7.7)
NEUTROPHILS NFR BLD: 61.7 % (ref 38–73)
NITRITE UR QL STRIP: NEGATIVE
NRBC BLD-RTO: 0 /100 WBC
PH UR STRIP: 6 [PH] (ref 5–8)
PLATELET # BLD AUTO: 275 K/UL (ref 150–450)
PMV BLD AUTO: 10.4 FL (ref 9.2–12.9)
POTASSIUM SERPL-SCNC: 4.4 MMOL/L (ref 3.5–5.1)
PROCALCITONIN SERPL IA-MCNC: 0.02 NG/ML
PROT SERPL-MCNC: 6.1 G/DL (ref 6–8.4)
PROT UR QL STRIP: NEGATIVE
RBC # BLD AUTO: 3.99 M/UL (ref 4–5.4)
RBC #/AREA URNS HPF: >100 /HPF (ref 0–4)
SARS-COV-2 RDRP RESP QL NAA+PROBE: NEGATIVE
SODIUM SERPL-SCNC: 140 MMOL/L (ref 136–145)
SP GR UR STRIP: 1.02 (ref 1–1.03)
SQUAMOUS #/AREA URNS HPF: 1 /HPF
TROPONIN I SERPL DL<=0.01 NG/ML-MCNC: <0.006 NG/ML (ref 0–0.03)
TSH SERPL DL<=0.005 MIU/L-ACNC: 3.33 UIU/ML (ref 0.4–4)
URN SPEC COLLECT METH UR: ABNORMAL
UROBILINOGEN UR STRIP-ACNC: NEGATIVE EU/DL
WBC # BLD AUTO: 14.07 K/UL (ref 3.9–12.7)
WBC #/AREA URNS HPF: 12 /HPF (ref 0–5)

## 2021-09-23 PROCEDURE — 81000 URINALYSIS NONAUTO W/SCOPE: CPT | Performed by: NURSE PRACTITIONER

## 2021-09-23 PROCEDURE — 83605 ASSAY OF LACTIC ACID: CPT | Performed by: NURSE PRACTITIONER

## 2021-09-23 PROCEDURE — 25000242 PHARM REV CODE 250 ALT 637 W/ HCPCS: Performed by: NURSE PRACTITIONER

## 2021-09-23 PROCEDURE — 81025 URINE PREGNANCY TEST: CPT | Performed by: NURSE PRACTITIONER

## 2021-09-23 PROCEDURE — 96365 THER/PROPH/DIAG IV INF INIT: CPT | Mod: 59

## 2021-09-23 PROCEDURE — 93005 ELECTROCARDIOGRAM TRACING: CPT

## 2021-09-23 PROCEDURE — 83690 ASSAY OF LIPASE: CPT | Performed by: NURSE PRACTITIONER

## 2021-09-23 PROCEDURE — 93010 EKG 12-LEAD: ICD-10-PCS | Mod: ,,, | Performed by: INTERNAL MEDICINE

## 2021-09-23 PROCEDURE — 87591 N.GONORRHOEAE DNA AMP PROB: CPT | Performed by: NURSE PRACTITIONER

## 2021-09-23 PROCEDURE — 87040 BLOOD CULTURE FOR BACTERIA: CPT | Mod: 59 | Performed by: NURSE PRACTITIONER

## 2021-09-23 PROCEDURE — 84484 ASSAY OF TROPONIN QUANT: CPT | Performed by: NURSE PRACTITIONER

## 2021-09-23 PROCEDURE — 99285 EMERGENCY DEPT VISIT HI MDM: CPT | Mod: 25

## 2021-09-23 PROCEDURE — 85379 FIBRIN DEGRADATION QUANT: CPT | Performed by: NURSE PRACTITIONER

## 2021-09-23 PROCEDURE — 80053 COMPREHEN METABOLIC PANEL: CPT | Performed by: NURSE PRACTITIONER

## 2021-09-23 PROCEDURE — 85025 COMPLETE CBC W/AUTO DIFF WBC: CPT | Performed by: NURSE PRACTITIONER

## 2021-09-23 PROCEDURE — 94640 AIRWAY INHALATION TREATMENT: CPT

## 2021-09-23 PROCEDURE — 25000003 PHARM REV CODE 250: Performed by: NURSE PRACTITIONER

## 2021-09-23 PROCEDURE — 25500020 PHARM REV CODE 255: Performed by: NURSE PRACTITIONER

## 2021-09-23 PROCEDURE — U0002 COVID-19 LAB TEST NON-CDC: HCPCS | Performed by: NURSE PRACTITIONER

## 2021-09-23 PROCEDURE — 83880 ASSAY OF NATRIURETIC PEPTIDE: CPT | Performed by: NURSE PRACTITIONER

## 2021-09-23 PROCEDURE — 87491 CHLMYD TRACH DNA AMP PROBE: CPT | Performed by: NURSE PRACTITIONER

## 2021-09-23 PROCEDURE — 63600175 PHARM REV CODE 636 W HCPCS: Performed by: NURSE PRACTITIONER

## 2021-09-23 PROCEDURE — 84145 PROCALCITONIN (PCT): CPT | Performed by: NURSE PRACTITIONER

## 2021-09-23 PROCEDURE — 87086 URINE CULTURE/COLONY COUNT: CPT | Performed by: NURSE PRACTITIONER

## 2021-09-23 PROCEDURE — 93010 ELECTROCARDIOGRAM REPORT: CPT | Mod: ,,, | Performed by: INTERNAL MEDICINE

## 2021-09-23 PROCEDURE — 96366 THER/PROPH/DIAG IV INF ADDON: CPT

## 2021-09-23 PROCEDURE — 84443 ASSAY THYROID STIM HORMONE: CPT | Performed by: NURSE PRACTITIONER

## 2021-09-23 RX ORDER — IPRATROPIUM BROMIDE AND ALBUTEROL SULFATE 2.5; .5 MG/3ML; MG/3ML
3 SOLUTION RESPIRATORY (INHALATION)
Status: COMPLETED | OUTPATIENT
Start: 2021-09-23 | End: 2021-09-23

## 2021-09-23 RX ORDER — CEPHALEXIN 500 MG/1
500 CAPSULE ORAL EVERY 12 HOURS
Qty: 20 CAPSULE | Refills: 0 | Status: SHIPPED | OUTPATIENT
Start: 2021-09-23 | End: 2021-10-03

## 2021-09-23 RX ORDER — ALBUTEROL SULFATE 90 UG/1
1-2 AEROSOL, METERED RESPIRATORY (INHALATION) EVERY 6 HOURS PRN
Qty: 8 G | Refills: 0 | Status: SHIPPED | OUTPATIENT
Start: 2021-09-23

## 2021-09-23 RX ADMIN — CEFTRIAXONE 2 G: 2 INJECTION, POWDER, FOR SOLUTION INTRAMUSCULAR; INTRAVENOUS at 12:09

## 2021-09-23 RX ADMIN — IPRATROPIUM BROMIDE AND ALBUTEROL SULFATE 3 ML: .5; 3 SOLUTION RESPIRATORY (INHALATION) at 12:09

## 2021-09-23 RX ADMIN — IOHEXOL 100 ML: 350 INJECTION, SOLUTION INTRAVENOUS at 10:09

## 2021-09-23 RX ADMIN — SODIUM CHLORIDE 1000 ML: 0.9 INJECTION, SOLUTION INTRAVENOUS at 12:09

## 2021-09-24 LAB
C TRACH DNA SPEC QL NAA+PROBE: NOT DETECTED
N GONORRHOEA DNA SPEC QL NAA+PROBE: NOT DETECTED

## 2021-09-25 LAB — BACTERIA UR CULT: NORMAL

## 2021-09-27 LAB
BACTERIA BLD CULT: ABNORMAL
BACTERIA BLD CULT: NORMAL

## 2021-12-04 ENCOUNTER — HOSPITAL ENCOUNTER (EMERGENCY)
Facility: HOSPITAL | Age: 41
Discharge: HOME | End: 2021-12-04
Attending: EMERGENCY MEDICINE
Payer: MEDICAID

## 2021-12-04 VITALS
WEIGHT: 293 LBS | SYSTOLIC BLOOD PRESSURE: 120 MMHG | RESPIRATION RATE: 20 BRPM | HEIGHT: 67 IN | TEMPERATURE: 98 F | DIASTOLIC BLOOD PRESSURE: 64 MMHG | OXYGEN SATURATION: 98 % | HEART RATE: 90 BPM | BODY MASS INDEX: 45.99 KG/M2

## 2021-12-04 DIAGNOSIS — M25.529 ELBOW PAIN: ICD-10-CM

## 2021-12-04 DIAGNOSIS — M77.9 TENDONITIS: Primary | ICD-10-CM

## 2021-12-04 LAB
B-HCG UR QL: NEGATIVE
CTP QC/QA: YES

## 2021-12-04 PROCEDURE — 99283 EMERGENCY DEPT VISIT LOW MDM: CPT | Mod: 25

## 2021-12-04 PROCEDURE — 81025 URINE PREGNANCY TEST: CPT | Performed by: NURSE PRACTITIONER

## 2022-04-24 ENCOUNTER — HOSPITAL ENCOUNTER (EMERGENCY)
Facility: HOSPITAL | Age: 42
Discharge: HOME | End: 2022-04-24
Attending: EMERGENCY MEDICINE
Payer: MEDICAID

## 2022-04-24 VITALS
TEMPERATURE: 98 F | OXYGEN SATURATION: 100 % | WEIGHT: 293 LBS | RESPIRATION RATE: 18 BRPM | SYSTOLIC BLOOD PRESSURE: 113 MMHG | HEART RATE: 68 BPM | BODY MASS INDEX: 45.99 KG/M2 | DIASTOLIC BLOOD PRESSURE: 56 MMHG | HEIGHT: 67 IN

## 2022-04-24 DIAGNOSIS — R20.2 PARESTHESIA OF LEFT UPPER AND LOWER EXTREMITY: Primary | ICD-10-CM

## 2022-04-24 DIAGNOSIS — M79.605 LEFT LEG PAIN: ICD-10-CM

## 2022-04-24 LAB
ALBUMIN SERPL BCP-MCNC: 3.6 G/DL (ref 3.5–5.2)
ALP SERPL-CCNC: 72 U/L (ref 55–135)
ALT SERPL W/O P-5'-P-CCNC: 19 U/L (ref 10–44)
ANION GAP SERPL CALC-SCNC: 8 MMOL/L (ref 8–16)
AST SERPL-CCNC: 21 U/L (ref 10–40)
BASOPHILS # BLD AUTO: 0.04 K/UL (ref 0–0.2)
BASOPHILS NFR BLD: 0.5 % (ref 0–1.9)
BILIRUB SERPL-MCNC: 0.5 MG/DL (ref 0.1–1)
BUN SERPL-MCNC: 17 MG/DL (ref 6–20)
CALCIUM SERPL-MCNC: 9 MG/DL (ref 8.7–10.5)
CHLORIDE SERPL-SCNC: 104 MMOL/L (ref 95–110)
CO2 SERPL-SCNC: 26 MMOL/L (ref 23–29)
CREAT SERPL-MCNC: 0.7 MG/DL (ref 0.5–1.4)
DIFFERENTIAL METHOD: ABNORMAL
EOSINOPHIL # BLD AUTO: 0.3 K/UL (ref 0–0.5)
EOSINOPHIL NFR BLD: 2.8 % (ref 0–8)
ERYTHROCYTE [DISTWIDTH] IN BLOOD BY AUTOMATED COUNT: 13.2 % (ref 11.5–14.5)
EST. GFR  (AFRICAN AMERICAN): >60 ML/MIN/1.73 M^2
EST. GFR  (NON AFRICAN AMERICAN): >60 ML/MIN/1.73 M^2
GLUCOSE SERPL-MCNC: 94 MG/DL (ref 70–110)
HCT VFR BLD AUTO: 38.9 % (ref 37–48.5)
HGB BLD-MCNC: 12.2 G/DL (ref 12–16)
IMM GRANULOCYTES # BLD AUTO: 0.03 K/UL (ref 0–0.04)
IMM GRANULOCYTES NFR BLD AUTO: 0.3 % (ref 0–0.5)
LYMPHOCYTES # BLD AUTO: 2.3 K/UL (ref 1–4.8)
LYMPHOCYTES NFR BLD: 26 % (ref 18–48)
MAGNESIUM SERPL-MCNC: 1.9 MG/DL (ref 1.6–2.6)
MCH RBC QN AUTO: 27.4 PG (ref 27–31)
MCHC RBC AUTO-ENTMCNC: 31.4 G/DL (ref 32–36)
MCV RBC AUTO: 87 FL (ref 82–98)
MONOCYTES # BLD AUTO: 0.8 K/UL (ref 0.3–1)
MONOCYTES NFR BLD: 8.5 % (ref 4–15)
NEUTROPHILS # BLD AUTO: 5.5 K/UL (ref 1.8–7.7)
NEUTROPHILS NFR BLD: 61.9 % (ref 38–73)
NRBC BLD-RTO: 0 /100 WBC
PLATELET # BLD AUTO: 238 K/UL (ref 150–450)
PMV BLD AUTO: 10.2 FL (ref 9.2–12.9)
POCT GLUCOSE: 103 MG/DL (ref 70–110)
POTASSIUM SERPL-SCNC: 4 MMOL/L (ref 3.5–5.1)
PROT SERPL-MCNC: 6.9 G/DL (ref 6–8.4)
RBC # BLD AUTO: 4.45 M/UL (ref 4–5.4)
SODIUM SERPL-SCNC: 138 MMOL/L (ref 136–145)
WBC # BLD AUTO: 8.87 K/UL (ref 3.9–12.7)

## 2022-04-24 PROCEDURE — 83735 ASSAY OF MAGNESIUM: CPT | Performed by: EMERGENCY MEDICINE

## 2022-04-24 PROCEDURE — 82962 GLUCOSE BLOOD TEST: CPT

## 2022-04-24 PROCEDURE — 99284 EMERGENCY DEPT VISIT MOD MDM: CPT | Mod: 25

## 2022-04-24 PROCEDURE — 85025 COMPLETE CBC W/AUTO DIFF WBC: CPT | Performed by: EMERGENCY MEDICINE

## 2022-04-24 PROCEDURE — 80053 COMPREHEN METABOLIC PANEL: CPT | Performed by: EMERGENCY MEDICINE

## 2022-04-24 RX ORDER — LOSARTAN POTASSIUM AND HYDROCHLOROTHIAZIDE 12.5; 5 MG/1; MG/1
1 TABLET ORAL DAILY
COMMUNITY
Start: 2022-03-25

## 2022-04-24 RX ORDER — GABAPENTIN 300 MG/1
300 CAPSULE ORAL 3 TIMES DAILY
Qty: 30 CAPSULE | Refills: 11 | Status: SHIPPED | OUTPATIENT
Start: 2022-04-24 | End: 2023-04-24

## 2022-04-24 NOTE — DISCHARGE INSTRUCTIONS
Please return immediately if you get worse or if new problems develop.  Gabapentin as directed.  You may follow-up with the neurologist above.  You may also follow-up with the primary care doctor.  Return immediately if you get worse or if new problems develop.